# Patient Record
Sex: FEMALE | Race: OTHER | Employment: UNEMPLOYED | URBAN - METROPOLITAN AREA
[De-identification: names, ages, dates, MRNs, and addresses within clinical notes are randomized per-mention and may not be internally consistent; named-entity substitution may affect disease eponyms.]

---

## 2017-01-24 ENCOUNTER — TELEPHONE (OUTPATIENT)
Dept: FAMILY MEDICINE CLINIC | Age: 52
End: 2017-01-24

## 2017-02-14 ENCOUNTER — OFFICE VISIT (OUTPATIENT)
Dept: INTERNAL MEDICINE CLINIC | Age: 52
End: 2017-02-14

## 2017-02-14 VITALS
OXYGEN SATURATION: 97 % | HEART RATE: 89 BPM | SYSTOLIC BLOOD PRESSURE: 138 MMHG | RESPIRATION RATE: 20 BRPM | BODY MASS INDEX: 25.76 KG/M2 | TEMPERATURE: 98.1 F | DIASTOLIC BLOOD PRESSURE: 85 MMHG | WEIGHT: 170 LBS | HEIGHT: 68 IN

## 2017-02-14 DIAGNOSIS — Z13.220 SCREENING FOR HYPERLIPIDEMIA: ICD-10-CM

## 2017-02-14 DIAGNOSIS — J42 CHRONIC BRONCHITIS, UNSPECIFIED CHRONIC BRONCHITIS TYPE (HCC): ICD-10-CM

## 2017-02-14 DIAGNOSIS — R53.83 FATIGUE, UNSPECIFIED TYPE: ICD-10-CM

## 2017-02-14 DIAGNOSIS — Z12.39 BREAST CANCER SCREENING: ICD-10-CM

## 2017-02-14 DIAGNOSIS — F41.9 ANXIETY: ICD-10-CM

## 2017-02-14 DIAGNOSIS — Z12.11 COLON CANCER SCREENING: ICD-10-CM

## 2017-02-14 DIAGNOSIS — Z12.31 ENCOUNTER FOR SCREENING MAMMOGRAM FOR BREAST CANCER: ICD-10-CM

## 2017-02-14 DIAGNOSIS — Z12.39 ENCOUNTER FOR BREAST CANCER SCREENING OTHER THAN MAMMOGRAM: ICD-10-CM

## 2017-02-14 DIAGNOSIS — M54.12 CERVICAL RADICULOPATHY: Primary | ICD-10-CM

## 2017-02-14 DIAGNOSIS — Z11.59 NEED FOR HEPATITIS C SCREENING TEST: ICD-10-CM

## 2017-02-14 DIAGNOSIS — Z13.6 SCREENING FOR CARDIOVASCULAR CONDITION: ICD-10-CM

## 2017-02-14 DIAGNOSIS — J30.9 ALLERGIC RHINITIS, UNSPECIFIED ALLERGIC RHINITIS TRIGGER, UNSPECIFIED RHINITIS SEASONALITY: ICD-10-CM

## 2017-02-14 RX ORDER — FLUTICASONE PROPIONATE 50 MCG
2 SPRAY, SUSPENSION (ML) NASAL DAILY
Qty: 1 BOTTLE | Refills: 11 | Status: SHIPPED | OUTPATIENT
Start: 2017-02-14 | End: 2018-11-06 | Stop reason: SDUPTHER

## 2017-02-14 RX ORDER — AZITHROMYCIN 250 MG/1
TABLET, FILM COATED ORAL
COMMUNITY
Start: 2017-01-23 | End: 2017-02-14 | Stop reason: ALTCHOICE

## 2017-02-14 RX ORDER — FLUTICASONE PROPIONATE 50 MCG
2 SPRAY, SUSPENSION (ML) NASAL DAILY
COMMUNITY
Start: 2017-01-23 | End: 2017-02-14 | Stop reason: SDUPTHER

## 2017-02-14 RX ORDER — CYCLOBENZAPRINE HCL 5 MG
5 TABLET ORAL
Qty: 30 TAB | Refills: 3 | Status: SHIPPED | OUTPATIENT
Start: 2017-02-14 | End: 2018-05-31

## 2017-02-14 RX ORDER — EPINEPHRINE 0.3 MG/.3ML
0.3 INJECTION SUBCUTANEOUS
Qty: 1 SYRINGE | Refills: 11 | Status: SHIPPED | OUTPATIENT
Start: 2017-02-14 | End: 2019-01-07 | Stop reason: SDUPTHER

## 2017-02-14 RX ORDER — ALBUTEROL SULFATE 90 UG/1
1 AEROSOL, METERED RESPIRATORY (INHALATION)
Qty: 1 INHALER | Refills: 11 | Status: SHIPPED | OUTPATIENT
Start: 2017-02-14 | End: 2018-11-06 | Stop reason: SDUPTHER

## 2017-02-14 RX ORDER — LORAZEPAM 0.5 MG/1
0.5 TABLET ORAL
Qty: 3 TAB | Refills: 0 | Status: SHIPPED | OUTPATIENT
Start: 2017-02-14 | End: 2017-03-26 | Stop reason: ALTCHOICE

## 2017-02-14 RX ORDER — FLUTICASONE PROPIONATE 110 UG/1
1 AEROSOL, METERED RESPIRATORY (INHALATION) EVERY 12 HOURS
Qty: 1 INHALER | Refills: 11 | Status: SHIPPED | OUTPATIENT
Start: 2017-02-14 | End: 2018-11-06 | Stop reason: SDUPTHER

## 2017-02-14 RX ORDER — ALBUTEROL SULFATE 90 UG/1
2 AEROSOL, METERED RESPIRATORY (INHALATION)
COMMUNITY
Start: 2017-01-23 | End: 2017-02-14 | Stop reason: ALTCHOICE

## 2017-02-14 NOTE — MR AVS SNAPSHOT
Visit Information Date & Time Provider Department Dept. Phone Encounter #  
 2/14/2017  1:30 PM Michael Leslie MD Internist of [address] [id number] Follow-up Instructions Return in about 6 weeks (around 3/28/2017) for COPD. Your Appointments 3/28/2017  2:00 PM  
Office Visit with Michael Leslie MD  
Internist of [address] [place] [address]-[facility]) Appt Note: ov 6wks jacques [address], [address] [address] [address] [place]  
  
   
 [address], [address] Upcoming Health Maintenance Date Due Hepatitis C Screening 1965 DTaP/Tdap/Td series (1 - Tdap) 8/11/1984 BREAST CANCER SCRN MAMMOGRAM 8/11/2013 FOBT Q 1 YEAR AGE 50-75 8/11/2013 INFLUENZA AGE 9 TO ADULT 8/1/2016 PAP AKA CERVICAL CYTOLOGY 2/14/2020 Allergies as of 2/14/2017  Review Complete On: 2/14/2017 By: James Tillman Severity Noted Reaction Type Reactions Latex, Natural Rubber Medium 02/14/2017    Rash Amoxicillin High 02/14/2017    Shortness of Breath, Swelling Aspirin High 02/14/2017    Itching Bactrim Ds [Sulfamethoxazole-trimethoprim] High 02/14/2017    Diarrhea, Nausea and Vomiting Benadryl [Diphenhydramine Hcl] High 02/14/2017    Shortness of Breath, Swelling Codeine High 02/14/2017    Diarrhea, Itching, Nausea and Vomiting Levaquin [Levofloxacin] High 02/14/2017    Rash Kathryn Bent Diphenhydramine Medium 02/14/2017    Itching Tylenol [Acetaminophen] Medium 02/14/2017    Itching Current Immunizations  Never Reviewed No immunizations on file. Not reviewed this visit You Were Diagnosed With   
  
 Codes Comments Cervical radiculopathy    -  Primary ICD-10-CM: M54.12 
ICD-9-CM: 723.4 Anxiety     ICD-10-CM: F41.9 ICD-9-CM: 300.00 Colon cancer screening     ICD-10-CM: Z12.11 ICD-9-CM: V76.51  Breast cancer screening     ICD-10-CM: Z12.39 
 ICD-9-CM: V76.10 Need for hepatitis C screening test     ICD-10-CM: Z11.59 
ICD-9-CM: V73.89 Fatigue, unspecified type     ICD-10-CM: R53.83 ICD-9-CM: 780.79 Screening for hyperlipidemia     ICD-10-CM: Z13.220 ICD-9-CM: V77.91 Screening for cardiovascular condition     ICD-10-CM: Z13.6 ICD-9-CM: V81.2 Allergic rhinitis, unspecified allergic rhinitis trigger, unspecified rhinitis seasonality     ICD-10-CM: J30.9 ICD-9-CM: 477.9 Chronic bronchitis, unspecified chronic bronchitis type ([facility]     ICD-10-CM: H94 ICD-9-CM: 491.9 Vitals BP Pulse Temp Resp Height(growth percentile) Weight(growth percentile) 138/85 (BP 1 Location: Left arm, BP Patient Position: Sitting) 89 98.1 °F (36.7 °C) (Oral) 20 5' 7.72\" (1.72 m) 170 lb (77.1 kg) SpO2 BMI OB Status Smoking Status 97% 26.06 kg/m2 Hysterectomy Current Every Day Smoker BMI and BSA Data Body Mass Index Body Surface Area 26.06 kg/m 2 1.92 m 2 Preferred Pharmacy Pharmacy Name Phone [facility] [address] [place] [address] [phone] Your Updated Medication List  
  
   
This list is accurate as of: 2/14/17  2:40 PM.  Always use your most recent med list.  
  
  
  
  
 albuterol 90 mcg/actuation inhaler Commonly known as:  PROVENTIL HFA, VENTOLIN HFA, PROAIR HFA Take 1 Puff by inhalation every four (4) hours as needed for Wheezing. cyclobenzaprine 5 mg tablet Commonly known as:  FLEXERIL Take 1 Tab by mouth three (3) times daily as needed for Muscle Spasm(s). EPINEPHrine 0.3 mg/0.3 mL injection Commonly known as:  EPIPEN  
0.3 mL by IntraMUSCular route once as needed for up to 1 dose. * fluticasone 110 mcg/actuation inhaler Commonly known as:  FLOVENT HFA Take 1 Puff by inhalation every twelve (12) hours. * fluticasone 50 mcg/actuation nasal spray Commonly known as:  Cathlyn Padilla 2 Sprays by Both Nostrils route daily. Indications: ALLERGIC RHINITIS LORazepam 0.5 mg tablet Commonly known as:  ATIVAN Take 1 Tab by mouth every one (1) hour as needed for Anxiety. Max Daily Amount: 12 mg. QVAR 80 mcg/actuation inhaler Generic drug:  beclomethasone Take 2 Puffs by inhalation two (2) times a day. Indications: SEVERE CHRONIC OBSTRUCTIVE PULMONARY DISEASE * Notice: This list has 2 medication(s) that are the same as other medications prescribed for you. Read the directions carefully, and ask your doctor or other care provider to review them with you. Prescriptions Printed Refills LORazepam (ATIVAN) 0.5 mg tablet 0 Sig: Take 1 Tab by mouth every one (1) hour as needed for Anxiety. Max Daily Amount: 12 mg. Class: Print Route: Oral  
  
Prescriptions Sent to Pharmacy Refills  
 albuterol (PROVENTIL HFA, VENTOLIN HFA, PROAIR HFA) 90 mcg/actuation inhaler 11 Sig: Take 1 Puff by inhalation every four (4) hours as needed for Wheezing. Class: Normal  
 Pharmacy: [facility] [address] Ph #: [phone] Route: Inhalation  
 fluticasone (FLOVENT HFA) 110 mcg/actuation inhaler 11 Sig: Take 1 Puff by inhalation every twelve (12) hours. Class: Normal  
 Pharmacy: [facility] [address] Ph #: [phone] Route: Inhalation  
 fluticasone (FLONASE) 50 mcg/actuation nasal spray 11 Si Sprays by Both Nostrils route daily. Indications: ALLERGIC RHINITIS Class: Normal  
 Pharmacy: [facility] [address] Ph #: [phone] Route: Both Nostrils EPINEPHrine (EPIPEN) 0.3 mg/0.3 mL injection 11 Si.3 mL by IntraMUSCular route once as needed for up to 1 dose. Class: Normal  
 Pharmacy: [facility] [address] Ph #: [phone] Route: IntraMUSCular cyclobenzaprine (FLEXERIL) 5 mg tablet 3 Sig: Take 1 Tab by mouth three (3) times daily as needed for Muscle Spasm(s). Class: Normal  
 Pharmacy: [address], [address] [place] [address] Ph #: [phone] Route: Oral  
  
We Performed the Following REFERRAL FOR COLONOSCOPY [[id number] Custom] REFERRAL TO ORTHOPEDICS [WET508 Custom] Comments:  
 Please schedule pt to see  (Edda Burkitt) if covered by her insurance. REFERRAL TO PSYCHIATRY [REF91 Custom] Follow-up Instructions Return in about 6 weeks (around 3/28/2017) for COPD. To-Do List   
 02/14/2017 Lab:  CBC WITH AUTOMATED DIFF   
  
 02/14/2017 Lab:  HEPATITIS C AB   
  
 02/14/2017 Lab:  LIPID PANEL   
  
 02/14/2017 Imaging:  TITO MAMMO BI DX INCL CAD Around 02/14/2017 Lab:  METABOLIC PANEL, BASIC   
  
 02/14/2017 Imaging:  MRI CERV SPINE W WO CONT Around 02/14/2017 Lab:  TSH AND FREE T4 Referral Information Referral ID Referred By Referred To  
  
 [id number] Suzi Shock Not Available Visits Status Start Date End Date 1 New Request 2/14/17 2/14/18 If your referral has a status of pending review or denied, additional information will be sent to support the outcome of this decision. Referral ID Referred By Referred To  
 [id number] Suzi Shock Not Available Visits Status Start Date End Date 1 New Request 2/14/17 2/14/18 If your referral has a status of pending review or denied, additional information will be sent to support the outcome of this decision. Referral ID Referred By Referred To  
 [id number] Suzi Shock Not Available Visits Status Start Date End Date 1 New Request 2/14/17 2/14/18 If your referral has a status of pending review or denied, additional information will be sent to support the outcome of this decision. Patient Instructions Health Maintenance Due Topic Date Due  
  Hepatitis C Screening  1965  DTaP/Tdap/Td series (1 - Tdap) 08/11/1984  PAP AKA CERVICAL CYTOLOGY  08/11/1984  BREAST CANCER SCRN MAMMOGRAM  08/11/2013  FOBT Q 1 YEAR AGE 50-75  08/11/2013  INFLUENZA AGE 9 TO ADULT  08/01/2016 Patient was given a copy of the Advanced Directive form and understands to bring it in once completed. PLAN: 
Marin points we discussed today: 1. Call our office if symptoms worsen or if you have any questions 2. Mammogram ordered 3. Referral placed for colon cancer screening 4. Referral placed to see [facility]. Make sure to schedule your apt. 
 
5. Lab work ordered to screen for kidney disease, high cholesterol, hepatitis C, and thyroid disease. 6. Cervical spine MRI ordered. Referral placed to see  (Orthopedic Surgery). 7. Epinephrine pen ordered. Dr. Radha Tenorio [facility] of [address] [place] [address] [address] Phone: [phone] Fax: [phone] Thank you for choosing [facility] for all of your health care needs. Chronic Obstructive Pulmonary Disease (COPD): Care Instructions Your Care Instructions Chronic obstructive pulmonary disease (COPD) is a general term for a group of lung diseases, including emphysema and chronic bronchitis. People with COPD have decreased airflow in and out of the lungs, which makes it hard to breathe. The airways also can get clogged with thick mucus. Cigarette smoking is a major cause of COPD. Although there is no cure for COPD, you can slow its progress. Following your treatment plan and taking care of yourself can help you feel better and live longer. Follow-up care is a key part of your treatment and safety. Be sure to make and go to all appointments, and call your doctor if you are having problems. It's also a good idea to know your test results and keep a list of the medicines you take. How can you care for yourself at home? Staying healthy · Do not smoke. This is the most important step you can take to prevent more damage to your lungs. If you need help quitting, talk to your doctor about stop-smoking programs and medicines. These can increase your chances of quitting for good. · Avoid colds and flu. Get a pneumococcal vaccine shot. If you have had one before, ask your doctor whether you need a second dose. Get the flu vaccine every fall. If you must be around people with colds or the flu, wash your hands often. · Avoid secondhand smoke, air pollution, and high altitudes. Also avoid cold, dry air and hot, humid air. Stay at home with your windows closed when air pollution is bad. Medicines and oxygen therapy · Take your medicines exactly as prescribed. Call your doctor if you think you are having a problem with your medicine. · You may be taking medicines such as: ¨ Bronchodilators. These help open your airways and make breathing easier. Bronchodilators are either short-acting (work for 6 to 9 hours) or long-acting (work for 24 hours). You inhale most bronchodilators, so they start to act quickly. Always carry your quick-relief inhaler with you in case you need it while you are away from home. ¨ Corticosteroids (prednisone, budesonide). These reduce airway inflammation. They come in pill or inhaled form. You must take these medicines every day for them to work well. · A spacer may help you get more inhaled medicine to your lungs. Ask your doctor or pharmacist if a spacer is right for you. If it is, ask how to use it properly. · Do not take any vitamins, over-the-counter medicine, or herbal products without talking to your doctor first. 
· If your doctor prescribed antibiotics, take them as directed. Do not stop taking them just because you feel better. You need to take the full course of antibiotics.  
· Oxygen therapy boosts the amount of oxygen in your blood and helps you breathe easier. Use the flow rate your doctor has recommended, and do not change it without talking to your doctor first. 
Activity · Get regular exercise. Walking is an easy way to get exercise. Start out slowly, and walk a little more each day. · Pay attention to your breathing. You are exercising too hard if you cannot talk while you are exercising. · Take short rest breaks when doing household chores and other activities. · Learn breathing methodssuch as breathing through pursed lipsto help you become less short of breath. · If your doctor has not set you up with a pulmonary rehabilitation program, talk to him or her about whether rehab is right for you. Rehab includes exercise programs, education about your disease and how to manage it, help with diet and other changes, and emotional support. Diet · Eat regular, healthy meals. Use bronchodilators about 1 hour before you eat to make it easier to eat. Eat several small meals instead of three large ones. Drink beverages at the end of the meal. Avoid foods that are hard to chew. · Eat foods that contain protein so that you do not lose muscle mass. Mental health · Talk to your family, friends, or a therapist about your feelings. It is normal to feel frightened, angry, hopeless, helpless, and even guilty. Talking openly about bad feelings can help you cope. If these feelings last, talk to your doctor. When should you call for help? Call 911 anytime you think you may need emergency care. For example, call if: 
· You have severe trouble breathing. Call your doctor now or seek immediate medical care if: 
· You have new or worse trouble breathing. · You cough up blood. · You have a fever. Watch closely for changes in your health, and be sure to contact your doctor if: 
· You cough more deeply or more often, especially if you notice more mucus or a change in the color of your mucus. · You have new or worse swelling in your legs or belly. · You are not getting better as expected. Where can you learn more? Go to http://star-sofía.info/. Jim Scott in the search box to learn more about \"Chronic Obstructive Pulmonary Disease (COPD): Care Instructions. \" Current as of: May 23, 2016 Content Version: 11.1 © 5328-8351 [employer]. Care instructions adapted under license by [employer] (which disclaims liability or warranty for this information). If you have questions about a medical condition or this instruction, always ask your healthcare professional. [address] any warranty or liability for your use of this information. Introducing \[facility]\"" & HEALTH SERVICES! [employer] introduces [employer] patient portal. Now you can access parts of your medical record, email your doctor's office, and request medication refills online. 1. In your internet browser, go to [email or web address] 2. Click on the First Time User? Click Here link in the Sign In box. You will see the New Member Sign Up page. 3. Enter your [employer] Access Code exactly as it appears below. You will not need to use this code after youve completed the sign-up process. If you do not sign up before the expiration date, you must request a new code. · [employer] Access Code: [email or web address] Expires: 5/15/2017  2:40 PM 
 
4. Enter the last four digits of your Social Security Number (xxxx) and Date of Birth (mm/dd/yyyy) as indicated and click Submit. You will be taken to the next sign-up page. 5. Create a [employer] ID. This will be your [employer] login ID and cannot be changed, so think of one that is secure and easy to remember. 6. Create a [employer] password. You can change your password at any time. 7. Enter your Password Reset Question and Answer. This can be used at a later time if you forget your password. 8. Enter your e-mail address.  You will receive e-mail notification when new information is available in [employer]. 9. Click Sign Up. You can now view and download portions of your medical record. 10. Click the Download Summary menu link to download a portable copy of your medical information. If you have questions, please visit the Frequently Asked Questions section of the [employer] website. Remember, [employer] is NOT to be used for urgent needs. For medical emergencies, dial 911. Now available from your iPhone and Android! Please provide this summary of care documentation to your next provider. If you have any questions after today's visit, please call [phone].

## 2017-02-14 NOTE — PROGRESS NOTES
Chief Complaint   Patient presents with    Establish Care    Depression     Active Diagnosed and will need a new Psychiatrist     Referral Request     Pulmonary Asthma and Severe COPD      Patient was given a copy of the Advanced Directive form and understands to bring it in once completed. 1. Have you been to the ER, urgent care clinic since your last visit? Hospitalized since your last visit?not applicable    2. Have you seen or consulted any other health care providers outside of the [address] [place] [address] since your last visit? Include any pap smears or colon screening. not applicable.

## 2017-02-14 NOTE — PROGRESS NOTES
INTERNISTS [facility]:  2/15/2017, MRN: [id number]      Ford Robin is a 46 y.o. female and presents to clinic to [facility]; Depression (Active Diagnosed and will need a new Psychiatrist ); and Referral Request (Pulmonary Asthma and Severe COPD)      Subjective:   Pt is a 48yo female with h/o anxiety and depression, COPD, nicotine dependence, neck pain, and asthma. 1. Neck Pain:   - Had neck surgery 4 years ago   - Stressors: financial; a family member is out of work   - Present x 2 wks   - Pain is waking her up from sleep   - Pain is similar to when she had neck pain prior to needing neck surgery 4 years ago   - No h/o trauma over the past month   - Pain is localized to her left neck and does not radiate   - No relief with NSAIDs with no relief. - Tylenol causes pruritus   - Biofreeze helps to relieve sx   - Previous physician: .    - Now having left arm paresthesias similar to what she had 4 years ago    2. Anxiety/PTSD:    - Can't go behind the wheel to drive due to anxiety   - S/p a dog bite a year ago on her leg and has worsening anxiety since then   - Given a prescription for clonazepam by her previous provider; per , her last prescription was filled >6 months ago   - Asking for a clonazepam refill today   - Did not respond to xanax in the past  - Tried venlafaxine and lexapro without good relief of her sx    - Participated in \"talk therapy\"   - Was followed by [address] [place] [address] Psychiatry but pt was discharged after \"no showing\" several times. Pt's  is out of work and pt was unable to pay her copays. - She has h/o severe anxiety and depression, requiring hospitalization when she was in her 25s. Pt at that time, had suicidal ideation. She reports none since then. Pt is  to another man and this marriage is going well for her. - Anxiety is associated with fatigue. No melena. No vaginal bleeding. No hematochezia.      3. COPD:   - Smoking >20 years   - Smoking 1/2-1 pack per day   - Wants to stop smoking   - Scared to try Chantix given the potential side effects affecting one's mood. - On Qvar but not taking it regularly   - On albuterol which pt uses bid. 4. Allergic Rhinitis:   - Had SOB/difficulty breathing when exposed to penicillin   - Has multiple environmental allergies. - Using a nasal steroid for symptomatic relief   - Needs a refill on her epi-pen. 5. Health Maintenance:   - Overdue for colonoscopy and mammogram      Patient Active Problem List    Diagnosis Date Noted    Anxiety and depression 02/15/2017    Chronic bronchitis 02/14/2017    Allergic rhinitis 02/14/2017       Current Outpatient Prescriptions   Medication Sig Dispense Refill    QVAR 80 mcg/actuation inhaler Take 2 Puffs by inhalation two (2) times a day. Indications: SEVERE CHRONIC OBSTRUCTIVE PULMONARY DISEASE      LORazepam (ATIVAN) 0.5 mg tablet Take 1 Tab by mouth every one (1) hour as needed for Anxiety. Max Daily Amount: 12 mg. 3 Tab 0    albuterol (PROVENTIL HFA, VENTOLIN HFA, PROAIR HFA) 90 mcg/actuation inhaler Take 1 Puff by inhalation every four (4) hours as needed for Wheezing. 1 Inhaler 11    fluticasone (FLOVENT HFA) 110 mcg/actuation inhaler Take 1 Puff by inhalation every twelve (12) hours. 1 Inhaler 11    fluticasone (FLONASE) 50 mcg/actuation nasal spray 2 Sprays by Both Nostrils route daily. Indications: ALLERGIC RHINITIS 1 Bottle 11    EPINEPHrine (EPIPEN) 0.3 mg/0.3 mL injection 0.3 mL by IntraMUSCular route once as needed for up to 1 dose. 1 Syringe 11    cyclobenzaprine (FLEXERIL) 5 mg tablet Take 1 Tab by mouth three (3) times daily as needed for Muscle Spasm(s).  30 Tab 3       Allergies   Allergen Reactions    Latex, Natural Rubber Rash    Amoxicillin Shortness of Breath and Swelling    Aspirin Itching    Bactrim Ds [Sulfamethoxazole-Trimethoprim] Diarrhea and Nausea and Vomiting    Benadryl [Diphenhydramine Hcl] Shortness of Breath and Swelling    Codeine Diarrhea, Itching and Nausea and Vomiting    Levaquin [Levofloxacin] Rash     Thrush    Diphenhydramine Itching    Tylenol [Acetaminophen] Itching       Past Medical History   Diagnosis Date    Arthritis     Asthma     Cancer ([facility] [address] 2006     cervical    Cervical cancer ([facility] 2006    Chronic obstructive pulmonary disease (HCC)     Chronic pain     Depression     H/O cervical spine surgery 2013     C3-7    Headache     Psychotic disorder      Anxiety and Panic Disorders       Past Surgical History   Procedure Laterality Date    Hx hysterectomy      Hx tonsillectomy      Hx wisdom teeth extraction       x4       Family History   Problem Relation Age of Onset    Cancer Mother      colon    Elevated Lipids Mother     Thyroid Disease Mother     Allergic Rhinitis Mother     Heart Disease Father     Diabetes Father     Hypertension Father     Kidney Disease Father     No Known Problems Sister     Heart Disease Maternal Grandmother     Heart Disease Maternal Grandfather     Heart Disease Paternal Grandmother     No Known Problems Paternal Grandfather     Cancer Sister     Depression Son        Social History   Substance Use Topics    Smoking status: Current Every Day Smoker     Packs/day: 1.00     Years: 31.00    Smokeless tobacco: Never Used    Alcohol use Yes      Comment: social       ROS   Review of Systems   Constitutional: Negative for chills and fever. HENT: Negative for ear pain and sore throat. Eyes: Negative for blurred vision and pain. Respiratory: Positive for shortness of breath (chronic). Negative for cough. Cardiovascular: Negative for chest pain. Gastrointestinal: Negative for abdominal pain. Genitourinary: Negative for dysuria. Musculoskeletal: Positive for myalgias and neck pain. Negative for joint pain. Skin: Negative for rash. Neurological: Positive for tingling (LUE - see HPI). Negative for focal weakness and headaches.    Endo/Heme/Allergies: Positive for environmental allergies. Does not bruise/bleed easily. Psychiatric/Behavioral: Negative for substance abuse. Objective     Vitals:    02/14/17 1328   BP: 138/85   Pulse: 89   Resp: 20   Temp: 98.1 °F (36.7 °C)   TempSrc: Oral   SpO2: 97%   Weight: 170 lb (77.1 kg)   Height: 5' 7.72\" (1.72 m)   PainSc:   7   PainLoc: Neck       Physical Exam   Constitutional: She is oriented to person, place, and time and well-developed, well-nourished, and in no distress. HENT:   Head: Normocephalic and atraumatic. Right Ear: External ear normal.   Left Ear: External ear normal.   Nose: Nose normal.   Mouth/Throat: Oropharynx is clear and moist. No oropharyngeal exudate. Clear TMs   Eyes: Conjunctivae and EOM are normal. Pupils are equal, round, and reactive to light. Right eye exhibits no discharge. Left eye exhibits no discharge. No scleral icterus. Neck: Neck supple. Cardiovascular: Normal rate, regular rhythm, normal heart sounds and intact distal pulses. Exam reveals no gallop and no friction rub. No murmur heard. Pulmonary/Chest: Effort normal and breath sounds normal. No respiratory distress. She has no wheezes. She has no rales. Abdominal: Soft. Bowel sounds are normal. She exhibits no distension. There is no tenderness. There is no rebound and no guarding. No hepatomegaly   Musculoskeletal: She exhibits no edema or tenderness (BUE are NTTP). +Has left trapezius superior border and left cervical paraspinal muscles that are TTP. Cervical spinous processes are TTP. Lymphadenopathy:     She has no cervical adenopathy. Neurological: She is alert and oriented to person, place, and time. She exhibits normal muscle tone. Gait normal.   Symmetric strength in BUE   Skin: Skin is warm and dry. No erythema. Psychiatric: Affect normal.   Nursing note and vitals reviewed. Assessment/Plan:   1. Cervical radiculopathy and Muscle Spasm: Has h/o cervical spine surgery.   - Referral to Orthopedics -  is preferred per pt request - as he did her previous cervical spine surgery. - Cervical spine MRI ordered. Ativan ordered prn anxiety with getting this study done. - Cyclobenzaprine ordered as needed for presumed muscle spasm. ORDERS:  - REFERRAL TO ORTHOPEDICS  - MRI CERV SPINE W WO CONT; Future  - LORazepam (ATIVAN) 0.5 mg tablet; Take 1 Tab by mouth every one (1) hour as needed for Anxiety. Max Daily Amount: 12 mg. Dispense: 3 Tab; Refill: 0  - cyclobenzaprine (FLEXERIL) 5 mg tablet; Take 1 Tab by mouth three (3) times daily as needed for Muscle Spasm(s). Dispense: 30 Tab; Refill: 3    2. Anxiety: Has h/o depression.   - Referral to Psychiatry - pt given the information for [facility] and will call to schedule her apt with them    ORDERS:  - REFERRAL TO PSYCHIATRY  - TSH AND FREE T4; Future    3. Cancer screenings:  - Referral placed for colonoscopy to screen or colon cancer  - Mammogram ordered to screen for breast cancer    ORDERS:  - REFERRAL FOR COLONOSCOPY  - TITO MAMMO BI DX INCL CAD; Future    4. Need for hepatitis C screening test  - Hepatitis C screening ordered per our discussion about CDC recommendations    ORDERS:  - HEPATITIS C AB; Future    5. Fatigue, unspecified type, associated with #2.  - Checking a BMP, TFTs, and a CBC    ORDERS:  - METABOLIC PANEL, BASIC; Future  - TSH AND FREE T4; Future  - CBC WITH AUTOMATED DIFF; Future    6. Screening for hyperlipidemia  - Lipid panel ordered    ORDERS:  - LIPID PANEL; Future    7. Allergic rhinitis, unspecified allergic rhinitis trigger, unspecified rhinitis seasonality  - Flonase refilled. Epi-pen refilled. ORDERS:  - fluticasone (FLONASE) 50 mcg/actuation nasal spray; 2 Sprays by Both Nostrils route daily. Indications: ALLERGIC RHINITIS  Dispense: 1 Bottle; Refill: 11  - EPINEPHrine (EPIPEN) 0.3 mg/0.3 mL injection; 0.3 mL by IntraMUSCular route once as needed for up to 1 dose. Dispense: 1 Syringe; Refill: 11    8.  Chronic bronchitis, unspecified chronic bronchitis type:  - Albuterol refilled. Qvar refilled. Pt instructed on the proper technique when using an inhaler. She was also asked to take the Qvar as prescribed vs prn. ORDERS:  - albuterol (PROVENTIL HFA, VENTOLIN HFA, PROAIR HFA) 90 mcg/actuation inhaler; Take 1 Puff by inhalation every four (4) hours as needed for Wheezing. Dispense: 1 Inhaler; Refill: 11  - fluticasone (FLOVENT HFA) 110 mcg/actuation inhaler; Take 1 Puff by inhalation every twelve (12) hours. Dispense: 1 Inhaler; Refill: 11      Health Maintenance Due   Topic Date Due    Hepatitis C Screening  1965    Pneumococcal 19-64 Medium Risk (1 of 1 - PPSV23) 08/11/1982    DTaP/Tdap/Td series (1 - Tdap) 08/11/1984    BREAST CANCER SCRN MAMMOGRAM  08/11/2013    FOBT Q 1 YEAR AGE 50-75  08/11/2013    INFLUENZA AGE 9 TO ADULT  08/01/2016       Lab review: orders written for new lab studies as appropriate; see orders    I have discussed the diagnosis with the patient and the intended plan as seen in the above orders. The patient has received an after-visit summary and questions were answered concerning future plans. I have discussed medication side effects and warnings with the patient as well. I have reviewed the plan of care with the patient, accepted their input and they are in agreement with the treatment goals. All questions were answered. The patient understands the plan of care. Handouts provided today with above information. Pt instructed if symptoms worsen to call the office or report to the ED for continued care. Greater than 50% of the visit time was spent in counseling and/or coordination of care. The patient was counseled on the dangers of tobacco use, and was advised to quit. Reviewed strategies to maximize success, including removing cigarettes and smoking materials from environment, stress management and substitution of other forms of reinforcement.  2 minutes were spent on smoking/tobacco cessation      Follow-up Disposition:  Return in about 6 weeks (around 3/28/2017) for COPD.     Michael Leslie MD

## 2017-02-14 NOTE — PATIENT INSTRUCTIONS
Health Maintenance Due   Topic Date Due    Hepatitis C Screening  1965    DTaP/Tdap/Td series (1 - Tdap) 08/11/1984    PAP AKA CERVICAL CYTOLOGY  08/11/1984    BREAST CANCER SCRN MAMMOGRAM  08/11/2013    FOBT Q 1 YEAR AGE 50-75  08/11/2013    INFLUENZA AGE 9 TO ADULT  08/01/2016     Patient was given a copy of the Advanced Directive form and understands to bring it in once completed. PLAN:  Marin points we discussed today:  1. Call our office if symptoms worsen or if you have any questions    2. Mammogram ordered    3. Referral placed for colon cancer screening    4. Referral placed to see [facility]. Make sure to schedule your apt.    5. Lab work ordered to screen for kidney disease, high cholesterol, hepatitis C, and thyroid disease. 6. Cervical spine MRI ordered. Referral placed to see  (Orthopedic Surgery). 7. Epinephrine pen ordered. Dr. Debby Younger  [facility] [address].  Phone: [phone]  Fax: [phone]    Thank you for choosing [employer] for all of your health care needs. Chronic Obstructive Pulmonary Disease (COPD): Care Instructions  Your Care Instructions    Chronic obstructive pulmonary disease (COPD) is a general term for a group of lung diseases, including emphysema and chronic bronchitis. People with COPD have decreased airflow in and out of the lungs, which makes it hard to breathe. The airways also can get clogged with thick mucus. Cigarette smoking is a major cause of COPD. Although there is no cure for COPD, you can slow its progress. Following your treatment plan and taking care of yourself can help you feel better and live longer. Follow-up care is a key part of your treatment and safety. Be sure to make and go to all appointments, and call your doctor if you are having problems.  It's also a good idea to know your test results and keep a list of the medicines you take. How can you care for yourself at home? Staying healthy  · Do not smoke. This is the most important step you can take to prevent more damage to your lungs. If you need help quitting, talk to your doctor about stop-smoking programs and medicines. These can increase your chances of quitting for good. · Avoid colds and flu. Get a pneumococcal vaccine shot. If you have had one before, ask your doctor whether you need a second dose. Get the flu vaccine every fall. If you must be around people with colds or the flu, wash your hands often. · Avoid secondhand smoke, air pollution, and high altitudes. Also avoid cold, dry air and hot, humid air. Stay at home with your windows closed when air pollution is bad. Medicines and oxygen therapy  · Take your medicines exactly as prescribed. Call your doctor if you think you are having a problem with your medicine. · You may be taking medicines such as:  ¨ Bronchodilators. These help open your airways and make breathing easier. Bronchodilators are either short-acting (work for 6 to 9 hours) or long-acting (work for 24 hours). You inhale most bronchodilators, so they start to act quickly. Always carry your quick-relief inhaler with you in case you need it while you are away from home. ¨ Corticosteroids (prednisone, budesonide). These reduce airway inflammation. They come in pill or inhaled form. You must take these medicines every day for them to work well. · A spacer may help you get more inhaled medicine to your lungs. Ask your doctor or pharmacist if a spacer is right for you. If it is, ask how to use it properly. · Do not take any vitamins, over-the-counter medicine, or herbal products without talking to your doctor first.  · If your doctor prescribed antibiotics, take them as directed. Do not stop taking them just because you feel better. You need to take the full course of antibiotics.   · Oxygen therapy boosts the amount of oxygen in your blood and helps you breathe easier. Use the flow rate your doctor has recommended, and do not change it without talking to your doctor first.  Activity  · Get regular exercise. Walking is an easy way to get exercise. Start out slowly, and walk a little more each day. · Pay attention to your breathing. You are exercising too hard if you cannot talk while you are exercising. · Take short rest breaks when doing household chores and other activities. · Learn breathing methodssuch as breathing through pursed lipsto help you become less short of breath. · If your doctor has not set you up with a pulmonary rehabilitation program, talk to him or her about whether rehab is right for you. Rehab includes exercise programs, education about your disease and how to manage it, help with diet and other changes, and emotional support. Diet  · Eat regular, healthy meals. Use bronchodilators about 1 hour before you eat to make it easier to eat. Eat several small meals instead of three large ones. Drink beverages at the end of the meal. Avoid foods that are hard to chew. · Eat foods that contain protein so that you do not lose muscle mass. Mental health  · Talk to your family, friends, or a therapist about your feelings. It is normal to feel frightened, angry, hopeless, helpless, and even guilty. Talking openly about bad feelings can help you cope. If these feelings last, talk to your doctor. When should you call for help? Call 911 anytime you think you may need emergency care. For example, call if:  · You have severe trouble breathing. Call your doctor now or seek immediate medical care if:  · You have new or worse trouble breathing. · You cough up blood. · You have a fever. Watch closely for changes in your health, and be sure to contact your doctor if:  · You cough more deeply or more often, especially if you notice more mucus or a change in the color of your mucus.   · You have new or worse swelling in your legs or belly.  · You are not getting better as expected. Where can you learn more? Go to http://star-sofía.info/. Don Mcqueen in the search box to learn more about \"Chronic Obstructive Pulmonary Disease (COPD): Care Instructions. \"  Current as of: May 23, 2016  Content Version: 11.1  © 6049-4717 [employer]. Care instructions adapted under license by [employer] (which disclaims liability or warranty for this information). If you have questions about a medical condition or this instruction, always ask your healthcare professional. [address] any warranty or liability for your use of this information.

## 2017-02-15 PROBLEM — F32.A ANXIETY AND DEPRESSION: Status: ACTIVE | Noted: 2017-02-15

## 2017-02-15 PROBLEM — F41.9 ANXIETY AND DEPRESSION: Status: ACTIVE | Noted: 2017-02-15

## 2017-02-22 ENCOUNTER — TELEPHONE (OUTPATIENT)
Dept: INTERNAL MEDICINE CLINIC | Age: 52
End: 2017-02-22

## 2017-02-22 NOTE — TELEPHONE ENCOUNTER
Reviewed report generated by the [facility]. Does not demonstrate aberrancies or inconsistencies with regard to the prescribing of controlled medications to this patient by other providers.   Last filled on 2/18/17 #90 for a 14 day supply by a doctor at [facility]    However, pt is in pain management? shes never filled her morphine though according to
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
## 2017-02-27 ENCOUNTER — TELEPHONE (OUTPATIENT)
Dept: INTERNAL MEDICINE CLINIC | Age: 52
End: 2017-02-27
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
## 2017-02-28 ENCOUNTER — DOCUMENTATION ONLY (OUTPATIENT)
Dept: SURGERY | Age: 52
End: 2017-02-28

## 2017-02-28 DIAGNOSIS — G62.9 NEUROPATHY: Primary | ICD-10-CM
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
## 2017-03-07 NOTE — TELEPHONE ENCOUNTER
Referral completed for Dr. Rhoades [facility] [id number] 2/28/2017-2/28/2018 99 visits    Copy faxed to doctor and mailed to pt

## 2017-03-20 ENCOUNTER — TELEPHONE (OUTPATIENT)
Dept: INTERNAL MEDICINE CLINIC | Age: 52
End: 2017-03-20

## 2017-03-20 NOTE — TELEPHONE ENCOUNTER
Please have pt contact the physician who prescribed this controlled pain medication for her.     Respectfully,  Dr. Sam Harkins  [facility], [address] [place] [address].  Phone: [phone]  Fax: [phone]

## 2017-03-21 ENCOUNTER — TELEPHONE (OUTPATIENT)
Dept: INTERNAL MEDICINE CLINIC | Age: 52
End: 2017-03-21

## 2017-03-21 NOTE — TELEPHONE ENCOUNTER
Called patient to let her know that we would be unable to give her a prescription for oxycontin. Patient was given her appointment with Neurology with Dr Artemio Barahona that I made for her. Patient is scheduled for May 1, 2017 Patient was scheduled with Dr Iesha Desai on Monday but they did not have a referral and patient was suppose to see Dr Tiffanie Diaz whom has recently left the practice.   I did apologize to patient about the mix up with neurologist.  Patient was very upset about the pain medication and stated \"ok that is fine i am going to call the state and my insurance company to file a complaint I should not have to be in pain and my PCP should give me my pain medications\"  I apologized to patient again and stated that I would let Dr Sara Dominguez know and patient hung up

## 2017-03-21 NOTE — TELEPHONE ENCOUNTER
Pt stated tht the pain meds was given to her at the hospital, she said she already asked them for refill and she was told to contact her pcp, Osorio Chairez
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
## 2017-03-22 ENCOUNTER — OFFICE VISIT (OUTPATIENT)
Dept: INTERNAL MEDICINE CLINIC | Age: 52
End: 2017-03-22

## 2017-03-22 VITALS
WEIGHT: 170 LBS | TEMPERATURE: 96.8 F | OXYGEN SATURATION: 98 % | HEART RATE: 99 BPM | RESPIRATION RATE: 20 BRPM | HEIGHT: 67 IN | BODY MASS INDEX: 26.68 KG/M2 | SYSTOLIC BLOOD PRESSURE: 151 MMHG | DIASTOLIC BLOOD PRESSURE: 91 MMHG

## 2017-03-22 DIAGNOSIS — S32.009A CLOSED FRACTURE OF LUMBAR VERTEBRA, UNSPECIFIED FRACTURE MORPHOLOGY, UNSPECIFIED LUMBAR VERTEBRAL LEVEL, INITIAL ENCOUNTER (HCC): ICD-10-CM

## 2017-03-22 DIAGNOSIS — S12.9XXA CLOSED FRACTURE OF CERVICAL VERTEBRAL BODY (HCC): ICD-10-CM

## 2017-03-22 DIAGNOSIS — S22.20XD CLOSED FRACTURE OF STERNUM WITH ROUTINE HEALING, UNSPECIFIED PORTION OF STERNUM, SUBSEQUENT ENCOUNTER: Primary | ICD-10-CM

## 2017-03-22 DIAGNOSIS — Z79.899 CONTROLLED SUBSTANCE AGREEMENT SIGNED: ICD-10-CM

## 2017-03-22 RX ORDER — OXYCODONE HYDROCHLORIDE 5 MG/1
5 TABLET ORAL
Qty: 120 TAB | Refills: 0 | Status: SHIPPED | OUTPATIENT
Start: 2017-03-22 | End: 2017-04-19 | Stop reason: ALTCHOICE

## 2017-03-22 NOTE — PATIENT INSTRUCTIONS
Oxycodone, Rapid Release (By mouth)   Oxycodone Hydrochloride (pe-f-CIH-done brian-droe-KLOR-lynn)  Treats moderate to severe pain. This medicine is a narcotic pain reliever. Brand Name(s):ETH-Oxydose, Oxaydo, Oxy IR, Roxicodone   There may be other brand names for this medicine. When This Medicine Should Not Be Used: This medicine is not right for everyone. Do not use it if you had an allergic reaction to oxycodone, codeine, hydrocodone, dihydrocodeine, or morphine. How to Use This Medicine:   Capsule, Liquid, Tablet  · Take your medicine as directed. Your dose may need to be changed several times to find what works best for you. · Measure the oral liquid medicine with a marked measuring spoon, oral syringe, or medicine cup. · Swallow the Oxaydo tablet whole with enough water to swallow it completely. Do not break, crush, chew, or dissolve it. Do not wet the tablet before you put it in your mouth. · Missed dose: Take a dose as soon as you remember. If it is almost time for your next dose, wait until then and take a regular dose. Do not take extra medicine to make up for a missed dose. · Store the medicine in a closed container at room temperature, away from heat, moisture, and direct light. Store the medicine in a secure place to prevent others from getting it. Ask your pharmacist about the best way to dispose of medicine you do not use. Drugs and Foods to Avoid:   Ask your doctor or pharmacist before using any other medicine, including over-the-counter medicines, vitamins, and herbal products. · Some medicines can affect how oxycodone works.  Tell your doctor if you are using any of the following:  ¨ Amiodarone, quinidine  ¨ MAO inhibitor (MAOI)  ¨ Medicine to treat an infection (such as erythromycin, ketoconazole, rifampin)  ¨ Medicine to treat HIV/AIDS (such as ritonavir)  ¨ Medicine to treat depression or anxiety  ¨ Medicine to treat seizures (such as carbamazepine, phenytoin)  ¨ Phenothiazine medicine (such as chlorpromazine, perphenazine, prochlorperazine, promethazine, thioridazine)  · Tell your doctor if you use anything else that makes you sleepy. Some examples are allergy medicine, narcotic pain medicine, and alcohol. Also tell your doctor if you are using buprenorphine, butorphanol, nalbuphine, pentazocine, or a muscle relaxer. · Do not drink alcohol while you are using this medicine. Warnings While Using This Medicine:   · Tell your doctor if you are pregnant or breastfeeding, or if you have kidney disease, liver disease, heart disease, low blood pressure, lung disease or breathing problems (such as asthma, COPD), scoliosis, an enlarged prostate or trouble urinating, an underactive thyroid, [place] disease, gallbladder or pancreas problems, or stomach or bowel problems. Tell your doctor if you have a history of head injury, mental health problems, seizures, or alcohol or drug addiction. · This medicine may cause the following problems:  ¨ High risk of overdose, which can lead to death  ¨ Respiratory depression (serious breathing problem that can be life-threatening)  ¨ Low blood pressure  · This medicine may make you dizzy, drowsy, or faint. Do not drive or do anything else that could be dangerous until you know how this medicine affects you. Sit or lie down if you feel dizzy. Stand up carefully. · This medicine may cause constipation, especially with long-term use. Ask your doctor if you should use a laxative to prevent and treat constipation. Drink plenty of liquids to help avoid constipation. · This medicine can be habit-forming. Do not use more than your prescribed dose. Call your doctor if you think your medicine is not working. · Do not stop using this medicine suddenly. Your doctor will need to slowly decrease your dose before you stop it completely. · Keep all medicine out of the reach of children. Never share your medicine with anyone.   Possible Side Effects While Using This Medicine: Call your doctor right away if you notice any of these side effects:  · Allergic reaction: Itching or hives, swelling in your face or hands, swelling or tingling in your mouth or throat, chest tightness, trouble breathing  · Blue lips, fingernails, or skin, trouble breathing  · Extreme dizziness or weakness, shallow breathing, slow heartbeat, sweating, cold or clammy skin, seizures  · Lightheadedness, dizziness, fainting  · Severe constipation  If you notice these less serious side effects, talk with your doctor:   · Mild constipation, nausea, or vomiting  · Sleepiness, tiredness  If you notice other side effects that you think are caused by this medicine, tell your doctor. Call your doctor for medical advice about side effects. You may report side effects to FDA at 0-835-FDA-6193  © 2016 [address] is for End User's use only and may not be sold, redistributed or otherwise used for commercial purposes. The above information is an  only. It is not intended as medical advice for individual conditions or treatments. Talk to your doctor, nurse or pharmacist before following any medical regimen to see if it is safe and effective for you.

## 2017-03-22 NOTE — MR AVS SNAPSHOT
Visit Information Date & Time Provider Department Dept. Phone Encounter #  
 3/22/2017 12:30 PM Windle Babinski, MD Internist of [address] [id number] Follow-up Instructions Return in about 4 weeks (around 4/19/2017) for pain, fractures, BP check. Your Appointments 3/28/2017  2:00 PM  
Office Visit with Windle Babinski, MD  
Internist of [address] [place] [address]) Appt Note: ov 6wks jacques [address], [address] Sinda Rosario [address]  
  
   
 [address], [address] 4/19/2017  2:30 PM  
Office Visit with Windle Babinski, MD  
Internist of [address] [place] [address]) Appt Note: 4 wk f/u  
 [address] [facility], [address] [place] [address]  
[phone] Upcoming Health Maintenance Date Due Hepatitis C Screening 1965 COLONOSCOPY 8/11/1981 Pneumococcal 19-64 Medium Risk (1 of 1 - PPSV23) 8/11/1982 BREAST CANCER SCRN MAMMOGRAM 8/11/2013 INFLUENZA AGE 9 TO ADULT 8/1/2016 PAP AKA CERVICAL CYTOLOGY 2/14/2020 DTaP/Tdap/Td series (2 - Td) 8/5/2025 Allergies as of 3/22/2017  Review Complete On: 3/22/2017 By: Reyna Samano LPN Severity Noted Reaction Type Reactions Latex, Natural Rubber Medium 02/14/2017    Rash Latex, Natural Rubber  08/11/2015    Hives Amoxicillin High 08/05/2015    Anaphylaxis Amoxicillin High 02/14/2017    Shortness of Breath, Swelling Aspirin High 02/14/2017    Itching Bactrim Ds [Sulfamethoxazole-trimethoprim] High 02/14/2017    Diarrhea, Nausea and Vomiting Benadryl [Diphenhydramine Hcl] High 02/14/2017    Shortness of Breath, Swelling Codeine High 02/14/2017    Diarrhea, Itching, Nausea and Vomiting Diphenhydramine Hcl High 08/11/2015    Anaphylaxis Levaquin [Levofloxacin] High 02/14/2017    Rash Stephanie Busman Diphenhydramine Medium 02/14/2017    Itching Tylenol [Acetaminophen] Medium 02/14/2017    Itching Aspirin  08/05/2015    Hives Azithromycin  08/11/2015    Diarrhea Bactrim Ds [Sulfamethoxazole-trimethoprim]  08/11/2015    Nausea and Vomiting Codeine  08/05/2015    Nausea and Vomiting Tylenol [Acetaminophen]  08/05/2015    Hives, Itching Sneezing and slight itching, eyes water Current Immunizations  Never Reviewed Name Date Tdap 8/5/2015 11:55 PM  
  
 Not reviewed this visit You Were Diagnosed With   
  
 Codes Comments Closed fracture of sternum with routine healing, unspecified portion of sternum, subsequent encounter    -  Primary ICD-10-CM: S22.20XD ICD-9-CM: V54.19 Closed fracture of cervical vertebral body (HCC)     ICD-10-CM: S12. 9XXA ICD-9-CM: 805.00 Closed fracture of lumbar vertebra, unspecified fracture morphology, unspecified lumbar vertebral level, initial encounter ([facility] [place] [facility]     ICD-10-CM: P10.289C ICD-9-CM: 805.4 Vitals BP Pulse Temp Resp Height(growth percentile) Weight(growth percentile) (!) 151/91 99 96.8 °F (36 °C) 20 5' 7\" (1.702 m) 170 lb (77.1 kg) SpO2 BMI OB Status Smoking Status 98% 26.63 kg/m2 Hysterectomy Current Every Day Smoker BMI and BSA Data Body Mass Index Body Surface Area  
 26.63 kg/m 2 1.91 m 2 Preferred Pharmacy Pharmacy Name Phone [facility] [address] [place] [address] [phone] Your Updated Medication List  
  
   
This list is accurate as of: 3/22/17  1:26 PM.  Always use your most recent med list.  
  
  
  
  
 albuterol 90 mcg/actuation inhaler Commonly known as:  PROVENTIL HFA, VENTOLIN HFA, PROAIR HFA Take 1 Puff by inhalation every four (4) hours as needed for Wheezing. ALPRAZolam 0.5 mg tablet Commonly known as:  Pia Furnace Take 1 Tab by mouth three (3) times daily as needed for Anxiety. Max Daily Amount: 1.5 mg.  
  
 amitriptyline 25 mg tablet Commonly known as:  ELAVIL  
 Take  by mouth nightly. azithromycin 250 mg tablet Commonly known as:  Sheridan Collar Take 2 tablets today, then take 1 tablet daily  
  
 cyclobenzaprine 5 mg tablet Commonly known as:  FLEXERIL Take 1 Tab by mouth three (3) times daily as needed for Muscle Spasm(s). EPINEPHrine 0.3 mg/0.3 mL injection Commonly known as:  EPIPEN  
0.3 mL by IntraMUSCular route once as needed for up to 1 dose. * fluticasone 110 mcg/actuation inhaler Commonly known as:  FLOVENT HFA Take 1 Puff by inhalation every twelve (12) hours. * fluticasone 50 mcg/actuation nasal spray Commonly known as:  Moni Timmons 2 Sprays by Both Nostrils route daily. Indications: ALLERGIC RHINITIS  
  
 guaiFENesin  mg SR tablet Commonly known as:  Marty & Marty Take 1 Tab by mouth two (2) times a day. ibuprofen 800 mg tablet Commonly known as:  MOTRIN Take 1 Tab by mouth every eight (8) hours as needed for Pain. Indications: PAIN  
  
 LORazepam 0.5 mg tablet Commonly known as:  ATIVAN Take 1 Tab by mouth every one (1) hour as needed for Anxiety. Max Daily Amount: 12 mg.  
  
 methocarbamol 750 mg tablet Commonly known as:  ROBAXIN Take 1 Tab by mouth three (3) times daily as needed. morphine CR 15 mg CR tablet Commonly known as:  MS CONTIN Take 1 Tab by mouth two (2) times a day. Max Daily Amount: 30 mg. For chronic pain  
  
 oxyCODONE IR 5 mg immediate release tablet Commonly known as:  Oneil Yair Take 1 Tab by mouth every six (6) hours as needed for Pain. Max Daily Amount: 20 mg.  
  
 predniSONE 10 mg dose pack Commonly known as:  STERAPRED DS See administration instruction per 10mg dose pack QVAR 80 mcg/actuation [employer] Generic drug:  beclomethasone Take 2 Puffs by inhalation two (2) times a day. Indications: SEVERE CHRONIC OBSTRUCTIVE PULMONARY DISEASE  
  
 varenicline 0.5 mg (11)- 1 mg (42) Dspk Commonly known as:  CHANTIX STARTER MYESHA Sig: Use as directed * Notice: This list has 2 medication(s) that are the same as other medications prescribed for you. Read the directions carefully, and ask your doctor or other care provider to review them with you. Prescriptions Printed Refills  
 oxyCODONE IR (ROXICODONE) 5 mg immediate release tablet 0 Sig: Take 1 Tab by mouth every six (6) hours as needed for Pain. Max Daily Amount: 20 mg.  
 Class: Print Route: Oral  
  
We Performed the Following REFERRAL TO ORTHOPEDICS [[id number] Custom] Comments:  
 Please fax to [facility] Follow-up Instructions Return in about 4 weeks (around 4/19/2017) for pain, fractures, BP check. Referral Information Referral ID Referred By Referred To  
  
 [id number] Lisa Calderas Not Available Visits Status Start Date End Date 1 New Request 3/22/17 3/22/18 If your referral has a status of pending review or denied, additional information will be sent to support the outcome of this decision. Patient Instructions Oxycodone, Rapid Release (By mouth) Oxycodone Hydrochloride (la-v-LXO-done brian-droe-KLOR-lynn) Treats moderate to severe pain. This medicine is a narcotic pain reliever. Brand Name(s):ETH-Oxydose, Oxaydo, Oxy IR, Roxicodone There may be other brand names for this medicine. When This Medicine Should Not Be Used: This medicine is not right for everyone. Do not use it if you had an allergic reaction to oxycodone, codeine, hydrocodone, dihydrocodeine, or morphine. How to Use This Medicine:  
Capsule, Liquid, Tablet · Take your medicine as directed. Your dose may need to be changed several times to find what works best for you. · Measure the oral liquid medicine with a marked measuring spoon, oral syringe, or medicine cup. · Swallow the Oxaydo tablet whole with enough water to swallow it completely. Do not break, crush, chew, or dissolve it. Do not wet the tablet before you put it in your mouth. · Missed dose: Take a dose as soon as you remember. If it is almost time for your next dose, wait until then and take a regular dose. Do not take extra medicine to make up for a missed dose. · Store the medicine in a closed container at room temperature, away from heat, moisture, and direct light. Store the medicine in a secure place to prevent others from getting it. Ask your pharmacist about the best way to dispose of medicine you do not use. Drugs and Foods to Avoid: Ask your doctor or pharmacist before using any other medicine, including over-the-counter medicines, vitamins, and herbal products. · Some medicines can affect how oxycodone works. Tell your doctor if you are using any of the following: ¨ Amiodarone, quinidine ¨ MAO inhibitor (MAOI) ¨ Medicine to treat an infection (such as erythromycin, ketoconazole, rifampin) ¨ Medicine to treat HIV/AIDS (such as ritonavir) ¨ Medicine to treat depression or anxiety ¨ Medicine to treat seizures (such as carbamazepine, phenytoin) ¨ Phenothiazine medicine (such as chlorpromazine, perphenazine, prochlorperazine, promethazine, thioridazine) · Tell your doctor if you use anything else that makes you sleepy. Some examples are allergy medicine, narcotic pain medicine, and alcohol. Also tell your doctor if you are using buprenorphine, butorphanol, nalbuphine, pentazocine, or a muscle relaxer. · Do not drink alcohol while you are using this medicine. Warnings While Using This Medicine: · Tell your doctor if you are pregnant or breastfeeding, or if you have kidney disease, liver disease, heart disease, low blood pressure, lung disease or breathing problems (such as asthma, COPD), scoliosis, an enlarged prostate or trouble urinating, an underactive thyroid, [place] disease, gallbladder or pancreas problems, or stomach or bowel problems. Tell your doctor if you have a history of head injury, mental health problems, seizures, or alcohol or drug addiction. · This medicine may cause the following problems: 
¨ High risk of overdose, which can lead to death ¨ Respiratory depression (serious breathing problem that can be life-threatening) ¨ Low blood pressure · This medicine may make you dizzy, drowsy, or faint. Do not drive or do anything else that could be dangerous until you know how this medicine affects you. Sit or lie down if you feel dizzy. Stand up carefully. · This medicine may cause constipation, especially with long-term use. Ask your doctor if you should use a laxative to prevent and treat constipation. Drink plenty of liquids to help avoid constipation. · This medicine can be habit-forming. Do not use more than your prescribed dose. Call your doctor if you think your medicine is not working. · Do not stop using this medicine suddenly. Your doctor will need to slowly decrease your dose before you stop it completely. · Keep all medicine out of the reach of children. Never share your medicine with anyone. Possible Side Effects While Using This Medicine:  
Call your doctor right away if you notice any of these side effects: · Allergic reaction: Itching or hives, swelling in your face or hands, swelling or tingling in your mouth or throat, chest tightness, trouble breathing · Blue lips, fingernails, or skin, trouble breathing · Extreme dizziness or weakness, shallow breathing, slow heartbeat, sweating, cold or clammy skin, seizures · Lightheadedness, dizziness, fainting · Severe constipation If you notice these less serious side effects, talk with your doctor: · Mild constipation, nausea, or vomiting · Sleepiness, tiredness If you notice other side effects that you think are caused by this medicine, tell your doctor. Call your doctor for medical advice about side effects. You may report side effects to FDA at 3-544-FDA-5673 © 2016 [address] is for End User's use only and may not be sold, redistributed or otherwise used for commercial purposes. The above information is an  only. It is not intended as medical advice for individual conditions or treatments. Talk to your doctor, nurse or pharmacist before following any medical regimen to see if it is safe and effective for you. Introducing [facility]! Khoa Atkinson introduces [email or web address] patient portal. Now you can access parts of your medical record, email your doctor's office, and request medication refills online. 1. In your internet browser, go to [email or web address] 2. Click on the First Time User? Click Here link in the Sign In box. You will see the New Member Sign Up page. 3. Enter your [email or web address] Access Code exactly as it appears below. You will not need to use this code after youve completed the sign-up process. If you do not sign up before the expiration date, you must request a new code. · [email or web address] Access Code: [email or web address] Expires: 5/15/2017  3:40 PM 
 
4. Enter the last four digits of your Social Security Number (xxxx) and Date of Birth (mm/dd/yyyy) as indicated and click Submit. You will be taken to the next sign-up page. 5. Create a [email or web address] ID. This will be your [email or web address] login ID and cannot be changed, so think of one that is secure and easy to remember. 6. Create a [email or web address] password. You can change your password at any time. 7. Enter your Password Reset Question and Answer. This can be used at a later time if you forget your password. 8. Enter your e-mail address. You will receive e-mail notification when new information is available in [address] 9. Click Sign Up. You can now view and download portions of your medical record. 10. Click the Download Summary menu link to download a portable copy of your medical information.  
 
If you have questions, please visit the Frequently Asked Questions section of the [employer]. Remember, [employer] is NOT to be used for urgent needs. For medical emergencies, dial 911. Now available from your iPhone and Android! Please provide this summary of care documentation to your next provider. Your primary care clinician is listed as Sb Mcfadden. If you have any questions after today's visit, please call [phone].

## 2017-03-23 NOTE — TELEPHONE ENCOUNTER
Called patient to inform her that Dr Braeden max does not take [place].   Patient verbalized an  Understanding and stated that I could send referral over to Dr [place] office

## 2017-03-26 PROBLEM — K44.9 HIATAL HERNIA: Status: ACTIVE | Noted: 2017-03-26

## 2017-03-26 PROBLEM — Z79.899 CONTROLLED SUBSTANCE AGREEMENT SIGNED: Status: ACTIVE | Noted: 2017-03-22

## 2017-03-26 PROBLEM — E04.1 RIGHT THYROID NODULE: Status: ACTIVE | Noted: 2017-03-26
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
## 2017-03-28 ENCOUNTER — OFFICE VISIT (OUTPATIENT)
Dept: ORTHOPEDIC SURGERY | Age: 52
End: 2017-03-28

## 2017-03-28 VITALS
SYSTOLIC BLOOD PRESSURE: 130 MMHG | HEART RATE: 95 BPM | WEIGHT: 160 LBS | DIASTOLIC BLOOD PRESSURE: 62 MMHG | OXYGEN SATURATION: 98 % | BODY MASS INDEX: 25.11 KG/M2 | RESPIRATION RATE: 18 BRPM | HEIGHT: 67 IN | TEMPERATURE: 97.8 F

## 2017-03-28 DIAGNOSIS — R26.89 FUNCTIONAL GAIT ABNORMALITY: ICD-10-CM

## 2017-03-28 DIAGNOSIS — S22.21XA CLOSED FRACTURE OF MANUBRIUM, INITIAL ENCOUNTER: ICD-10-CM

## 2017-03-28 DIAGNOSIS — S32.010A COMPRESSION FRACTURE OF L1 LUMBAR VERTEBRA, CLOSED, INITIAL ENCOUNTER (HCC): Primary | ICD-10-CM

## 2017-03-28 DIAGNOSIS — G56.03 BILATERAL CARPAL TUNNEL SYNDROME: ICD-10-CM

## 2017-03-28 DIAGNOSIS — M62.830 MUSCLE SPASM OF BACK: ICD-10-CM

## 2017-03-28 DIAGNOSIS — F41.9 ANXIETY AND DEPRESSION: ICD-10-CM

## 2017-03-28 DIAGNOSIS — F32.A ANXIETY AND DEPRESSION: ICD-10-CM

## 2017-03-28 DIAGNOSIS — F17.210 NICOTINE DEPENDENCE, CIGARETTES, UNCOMPLICATED: ICD-10-CM

## 2017-03-28 DIAGNOSIS — R29.898 WEAKNESS OF BOTH ARMS: ICD-10-CM

## 2017-03-28 DIAGNOSIS — M54.2 NECK PAIN: ICD-10-CM
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
## 2017-03-28 NOTE — PATIENT INSTRUCTIONS
[facility] Activation    Thank you for requesting access to [facility]. Please follow the instructions below to securely access and download your online medical record. [facility] allows you to send messages to your doctor, view your test results, renew your prescriptions, schedule appointments, and more. How Do I Sign Up? 1. In your internet browser, go to [email or web address]  2. Click on the First Time User? Click Here link in the Sign In box. You will be redirect to the New Member Sign Up page. 3. Enter your [facility] [employer] Access Code exactly as it appears below. You will not need to use this code after youve completed the sign-up process. If you do not sign up before the expiration date, you must request a new code. [facility] Access Code: [email or web address]  Expires: 5/15/2017  3:40 PM (This is the date your [facility] [employer] access code will )    4. Enter the last four digits of your Social Security Number (xxxx) and Date of Birth (mm/dd/yyyy) as indicated and click Submit. You will be taken to the next sign-up page. 5. Create a [facility] ID. This will be your [facility] login ID and cannot be changed, so think of one that is secure and easy to remember. 6. Create a [facility] password. You can change your password at any time. 7. Enter your Password Reset Question and Answer. This can be used at a later time if you forget your password. 8. Enter your e-mail address. You will receive e-mail notification when new information is available in [address]. 9. Click Sign Up. You can now view and download portions of your medical record. 10. Click the Download Summary menu link to download a portable copy of your medical information. Additional Information    If you have questions, please visit the Frequently Asked Questions section of the [employer] website at [email or web address] "[employer]/. Remember, [facility] [employer] is NOT to be used for urgent needs. For medical emergencies, dial 911.          Learning About Benefits From Quitting Smoking  How does quitting smoking make you healthier? If you're thinking about quitting smoking, you may have a few reasons to be smoke-free. Your health may be one of them. · When you quit smoking, you lower your risks for cancer, lung disease, heart attack, stroke, blood vessel disease, and blindness from macular degeneration. · When you're smoke-free, you get sick less often, and you heal faster. You are less likely to get colds, flu, bronchitis, and pneumonia. · As a nonsmoker, you may find that your mood is better and you are less stressed. When and how will you feel healthier? Quitting has real health benefits that start from day 1 of being smoke-free. And the longer you stay smoke-free, the healthier you get and the better you feel. The first hours  · After just 20 minutes, your blood pressure and heart rate go down. That means there's less stress on your heart and blood vessels. · Within 12 hours, the level of carbon monoxide in your blood drops back to normal. That makes room for more oxygen. With more oxygen in your body, you may notice that you have more energy than when you smoked. After 2 weeks  · Your lungs start to work better. · Your risk of heart attack starts to drop. After 1 month  · When your lungs are clear, you cough less and breathe deeper, so it's easier to be active. · Your sense of taste and smell return. That means you can enjoy food more than you have since you started smoking. Over the years  · After 1 year, your risk of heart disease is half what it would be if you kept smoking. · After 5 years, your risk of stroke starts to shrink. Within a few years after that, it's about the same as if you'd never smoked. · After 10 years, your risk of dying from lung cancer is cut by about half. And your risk for many other types of cancer is lower too. How would quitting help others in your life?   When you quit smoking, you improve the health of everyone who now breathes in your smoke. · Their heart, lung, and cancer risks drop, much like yours. · They are sick less. For babies and small children, living smoke-free means they're less likely to have ear infections, pneumonia, and bronchitis. · If you're a woman who is or will be pregnant someday, quitting smoking means a healthier . · Children who are close to you are less likely to become adult smokers. Where can you learn more? Go to http://star-sofía.info/. Enter 052 [id number] 72 11 in the search box to learn more about \"Learning About Benefits From Quitting Smoking. \"  Current as of: May 26, 2016  Content Version: 11.1  © 7058-5675 [employer]. Care instructions adapted under license by [employer] (which disclaims liability or warranty for this information). If you have questions about a medical condition or this instruction, always ask your healthcare professional. [place] [address] any warranty or liability for your use of this information. Stopping Smoking: Care Instructions  Your Care Instructions  Cigarette smokers crave the nicotine in cigarettes. Giving it up is much harder than simply changing a habit. Your body has to stop craving the nicotine. It is hard to quit, but you can do it. There are many tools that people use to quit smoking. You may find that combining tools works best for you. There are several steps to quitting. First you get ready to quit. Then you get support to help you. After that, you learn new skills and behaviors to become a nonsmoker. For many people, a necessary step is getting and using medicine. Your doctor will help you set up the plan that best meets your needs. You may want to attend a smoking cessation program to help you quit smoking. When you choose a program, look for one that has proven success. Ask your doctor for ideas.  You will greatly increase your chances of success if you take medicine as well as get counseling or join a cessation program.  Some of the changes you feel when you first quit tobacco are uncomfortable. Your body will miss the nicotine at first, and you may feel short-tempered and grumpy. You may have trouble sleeping or concentrating. Medicine can help you deal with these symptoms. You may struggle with changing your smoking habits and rituals. The last step is the tricky one: Be prepared for the smoking urge to continue for a time. This is a lot to deal with, but keep at it. You will feel better. Follow-up care is a key part of your treatment and safety. Be sure to make and go to all appointments, and call your doctor if you are having problems. Its also a good idea to know your test results and keep a list of the medicines you take. How can you care for yourself at home? · Ask your family, friends, and coworkers for support. You have a better chance of quitting if you have help and support. · Join a support group, such as Nicotine Anonymous, for people who are trying to quit smoking. · Consider signing up for a smoking cessation program, such as the American Lung Association's Freedom from Smoking program.  · Set a quit date. Pick your date carefully so that it is not right in the middle of a big deadline or stressful time. Once you quit, do not even take a puff. Get rid of all ashtrays and lighters after your last cigarette. Clean your house and your clothes so that they do not smell of smoke. · Learn how to be a nonsmoker. Think about ways you can avoid those things that make you reach for a cigarette. ¨ Avoid situations that put you at greatest risk for smoking. For some people, it is hard to have a drink with friends without smoking. For others, they might skip a coffee break with coworkers who smoke. ¨ Change your daily routine. Take a different route to work or eat a meal in a different place. · Cut down on stress.  Calm yourself or release tension by doing an activity you enjoy, such as reading a book, taking a hot bath, or gardening. · Talk to your doctor or pharmacist about nicotine replacement therapy, which replaces the nicotine in your body. You still get nicotine but you do not use tobacco. Nicotine replacement products help you slowly reduce the amount of nicotine you need. These products come in several forms, many of them available over-the-counter:  ¨ Nicotine patches  ¨ Nicotine gum and lozenges  ¨ Nicotine inhaler  · Ask your doctor about bupropion (Wellbutrin) or varenicline (Chantix), which are prescription medicines. They do not contain nicotine. They help you by reducing withdrawal symptoms, such as stress and anxiety. · Some people find hypnosis, acupuncture, and massage helpful for ending the smoking habit. · Eat a healthy diet and get regular exercise. Having healthy habits will help your body move past its craving for nicotine. · Be prepared to keep trying. Most people are not successful the first few times they try to quit. Do not get mad at yourself if you smoke again. Make a list of things you learned and think about when you want to try again, such as next week, next month, or next year. Where can you learn more? Go to http://star-sofía.info/. Enter F845 in the search box to learn more about \"Stopping Smoking: Care Instructions. \"  Current as of: May 26, 2016  Content Version: 11.1  © 8847-7692 [employer]. Care instructions adapted under license by [employer] (which disclaims liability or warranty for this information). If you have questions about a medical condition or this instruction, always ask your healthcare professional. [place] [address] any warranty or liability for your use of this information. Carpal Tunnel Syndrome: Exercises  Your Care Instructions  Here are some examples of typical rehabilitation exercises for your condition. Start each exercise slowly.  Ease off the exercise if you start to have pain. Your doctor or your physical or occupational therapist will tell you when you can start these exercises and which ones will work best for you. How to do the exercises  Note: When you no longer have pain or numbness, you can do exercises to help prevent carpal tunnel syndrome from coming back. Do not do any stretch or movement that is uncomfortable or painful. Warm-up stretches  1. Rotate your wrist up, down, and from side to side. Repeat 4 times. 2. Stretch your fingers far apart. Relax them, and then stretch them again. Repeat 4 times. 3. Stretch your thumb by pulling it back gently, holding it, and then releasing it. Repeat 4 times. Prayer stretch    1. Start with your palms together in front of your chest just below your chin. 2. Slowly lower your hands toward your waistline, keeping your hands close to your stomach and your palms together until you feel a mild to moderate stretch under your forearms. 3. Hold for at least 15 to 30 seconds. Repeat 2 to 4 times. Wrist flexor stretch    1. Extend your arm in front of you with your palm up. 2. Bend your wrist, pointing your hand toward the floor. 3. With your other hand, gently bend your wrist farther until you feel a mild to moderate stretch in your forearm. 4. Hold for at least 15 to 30 seconds. Repeat 2 to 4 times. Wrist extensor stretch    Repeat steps 1 through 4 of the stretch above, but begin with your extended hand palm down. Follow-up care is a key part of your treatment and safety. Be sure to make and go to all appointments, and call your doctor if you are having problems. It's also a good idea to know your test results and keep a list of the medicines you take. Where can you learn more? Go to http://star-sofía.info/. Enter T603 in the search box to learn more about \"Carpal Tunnel Syndrome: Exercises. \"  Current as of: May 23, 2016  Content Version: 11.1  © 2735-3166 [employer], [facility].  Care instructions adapted under license by [employer] (which disclaims liability or warranty for this information). If you have questions about a medical condition or this instruction, always ask your healthcare professional. [address] any warranty or liability for your use of this information. Neck Arthritis: Exercises  Your Care Instructions  Here are some examples of typical rehabilitation exercises for your condition. Start each exercise slowly. Ease off the exercise if you start to have pain. Your doctor or physical therapist will tell you when you can start these exercises and which ones will work best for you. How to do the exercises  Neck stretches to the side    4. This stretch works best if you keep your shoulder down as you lean away from it. To help you remember to do this, start by relaxing your shoulders and lightly holding on to your thighs or your chair. 5. Tilt your head toward your shoulder and hold for 15 to 30 seconds. Let the weight of your head stretch your muscles. 6. Repeat 2 to 4 times toward each shoulder. Chin tuck    4. Lie on the floor with a rolled-up towel under your neck. Your head should be touching the floor. 5. Slowly bring your chin toward your chest.  6. Hold for a count of 6, and then relax for up to 10 seconds. 7. Repeat 8 to 12 times. Active cervical rotation    5. Sit in a firm chair, or stand up straight. 6. Keeping your chin level, turn your head to the right, and hold for 15 to 30 seconds. 7. Turn your head to the left and hold for 15 to 30 seconds. 8. Repeat 2 to 4 times to each side. Shoulder blade squeeze    1. While standing, squeeze your shoulder blades together. 2. Do not raise your shoulders up as you are squeezing. 3. Hold for 6 seconds. 4. Repeat 8 to 12 times. Shoulder rolls    1. Sit comfortably with your feet shoulder-width apart. You can also do this exercise standing up.   2. Roll your shoulders up, then back, and then down in a smooth, circular motion. 3. Repeat 2 to 4 times. Follow-up care is a key part of your treatment and safety. Be sure to make and go to all appointments, and call your doctor if you are having problems. It's also a good idea to know your test results and keep a list of the medicines you take. Where can you learn more? Go to http://star-sofía.info/. Enter G782 in the search box to learn more about \"Neck Arthritis: Exercises. \"  Current as of: May 23, 2016  Content Version: 11.1  © 8812-0716 [employer]. Care instructions adapted under license by [employer] (which disclaims liability or warranty for this information). If you have questions about a medical condition or this instruction, always ask your healthcare professional. [address] any warranty or liability for your use of this information.

## 2017-03-29 ENCOUNTER — TELEPHONE (OUTPATIENT)
Dept: INTERNAL MEDICINE CLINIC | Age: 52
End: 2017-03-29
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
## 2017-03-31 NOTE — TELEPHONE ENCOUNTER
Referral done for Dr. Cyndee Tran  99 visits 03/31/17-03/31/18  TVXM#[phone]  Faxed to [phone]  Copy mailed to joleen jacques

## 2017-04-03 ENCOUNTER — TELEPHONE (OUTPATIENT)
Dept: INTERNAL MEDICINE CLINIC | Age: 52
End: 2017-04-03

## 2017-04-03 DIAGNOSIS — S06.0X0A CONCUSSION, WITHOUT LOC, INITIAL ENCOUNTER: ICD-10-CM

## 2017-04-03 DIAGNOSIS — S22.20XS CLOSED FRACTURE OF STERNUM, UNSPECIFIED PORTION OF STERNUM, SEQUELA: Primary | ICD-10-CM

## 2017-04-03 NOTE — TELEPHONE ENCOUNTER
Referrals placed.     Dr. Randall Agosto  [facility], [address] [place] [address] [address]  Phone: [phone]  Fax: [phone]

## 2017-04-03 NOTE — TELEPHONE ENCOUNTER
Call from Darling Almanza at [facility], she states tht pt now needs referrals to a neurologist and a cardiac thoracic surgeon.   They are recommending Dr. Quinn Gage as a neurologist for concussion short term memory [phone] and [facility] for fractured sternum [phone], Jericho Hawley

## 2017-04-10 ENCOUNTER — TELEPHONE (OUTPATIENT)
Dept: INTERNAL MEDICINE CLINIC | Age: 52
End: 2017-04-10

## 2017-04-10 DIAGNOSIS — S22.20XS: Primary | ICD-10-CM

## 2017-04-10 DIAGNOSIS — S42.309S: Primary | ICD-10-CM

## 2017-04-10 NOTE — TELEPHONE ENCOUNTER
DR Fátima Pearl -[phone]  Card[facility] at [facility]- [phone]    Take her ins- she says she spoke with someone about a referral

## 2017-04-11 ENCOUNTER — TELEPHONE (OUTPATIENT)
Dept: CARDIOTHORACIC SURGERY | Age: 52
End: 2017-04-11

## 2017-04-11 NOTE — TELEPHONE ENCOUNTER
Spoke with patient and  Confirmed  appointment change  From : Thursday @ 1:30  To tomorrow : Wed. 04/12/17 @     10:30 am.

## 2017-04-18 ENCOUNTER — HOSPITAL ENCOUNTER (OUTPATIENT)
Dept: NON INVASIVE DIAGNOSTICS | Age: 52
Discharge: HOME | End: 2017-04-18
Attending: THORACIC SURGERY (CARDIOTHORACIC VASCULAR SURGERY)
Payer: MEDICARE

## 2017-04-18 ENCOUNTER — OFFICE VISIT (OUTPATIENT)
Dept: CARDIOTHORACIC SURGERY | Age: 52
End: 2017-04-18

## 2017-04-18 ENCOUNTER — TELEPHONE (OUTPATIENT)
Dept: CARDIOTHORACIC SURGERY | Age: 52
End: 2017-04-18

## 2017-04-18 VITALS
HEART RATE: 79 BPM | WEIGHT: 169.5 LBS | SYSTOLIC BLOOD PRESSURE: 127 MMHG | OXYGEN SATURATION: 97 % | DIASTOLIC BLOOD PRESSURE: 82 MMHG | HEIGHT: 67 IN | BODY MASS INDEX: 26.6 KG/M2 | RESPIRATION RATE: 20 BRPM | TEMPERATURE: 98 F

## 2017-04-18 DIAGNOSIS — S22.22XA CLOSED FRACTURE OF BODY OF STERNUM, INITIAL ENCOUNTER: Primary | ICD-10-CM

## 2017-04-18 DIAGNOSIS — S22.22XA CLOSED FRACTURE OF BODY OF STERNUM, INITIAL ENCOUNTER: ICD-10-CM

## 2017-04-18 PROCEDURE — 93306 TTE W/DOPPLER COMPLETE: CPT
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
## 2017-04-18 NOTE — TELEPHONE ENCOUNTER
Called [employer] to obtain auth for pt to have 2D Echo as ordered by Dr. Bindu Chambers during pt's clinic visit. S/w Johann Santo w/ medical management. Clinical information given to assist w/ getting auth for Echo. Auth received. Auth # [id number].

## 2017-04-18 NOTE — PROGRESS NOTES
Laura Hook is a 46 y.o. female referred by Dr Stephen Verde to see Dr. Arsalan Kramer for sternal fracture. Advanced directive: No    Medications not taking or discontinued: See Med Rec    1. Have you been to the ER, urgent care clinic since your last visit? Hospitalized since your last visit? No    2. Have you seen or consulted any other health care providers outside of the [address] [place] [address] since your last visit? Include any pap smears or colon screening. No    Ms. Katie Brody has a reminder for a \"due or due soon\" health maintenance. I have asked that she contact her primary care provider for follow-up on this health maintenance.
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
## 2017-04-19 ENCOUNTER — OFFICE VISIT (OUTPATIENT)
Dept: INTERNAL MEDICINE CLINIC | Age: 52
End: 2017-04-19

## 2017-04-19 ENCOUNTER — TELEPHONE (OUTPATIENT)
Dept: INTERNAL MEDICINE CLINIC | Age: 52
End: 2017-04-19

## 2017-04-19 VITALS
WEIGHT: 168.6 LBS | SYSTOLIC BLOOD PRESSURE: 143 MMHG | TEMPERATURE: 96.5 F | HEIGHT: 67 IN | HEART RATE: 74 BPM | OXYGEN SATURATION: 97 % | DIASTOLIC BLOOD PRESSURE: 88 MMHG | BODY MASS INDEX: 26.46 KG/M2 | RESPIRATION RATE: 14 BRPM

## 2017-04-19 DIAGNOSIS — M54.41 CHRONIC MIDLINE LOW BACK PAIN WITH BILATERAL SCIATICA: ICD-10-CM

## 2017-04-19 DIAGNOSIS — F32.9 SINGLE CURRENT EPISODE OF MAJOR DEPRESSIVE DISORDER, UNSPECIFIED DEPRESSION EPISODE SEVERITY: Primary | ICD-10-CM

## 2017-04-19 DIAGNOSIS — R23.3 EASY BRUISING: ICD-10-CM

## 2017-04-19 DIAGNOSIS — Z13.220 SCREENING FOR HYPERLIPIDEMIA: ICD-10-CM

## 2017-04-19 DIAGNOSIS — M54.42 CHRONIC MIDLINE LOW BACK PAIN WITH BILATERAL SCIATICA: ICD-10-CM

## 2017-04-19 DIAGNOSIS — G89.29 CHRONIC MIDLINE LOW BACK PAIN WITH BILATERAL SCIATICA: ICD-10-CM

## 2017-04-19 DIAGNOSIS — Z11.59 NEED FOR HEPATITIS C SCREENING TEST: ICD-10-CM

## 2017-04-19 RX ORDER — CITALOPRAM 20 MG/1
20 TABLET, FILM COATED ORAL DAILY
Qty: 30 TAB | Refills: 1 | Status: SHIPPED | OUTPATIENT
Start: 2017-04-19 | End: 2017-06-01 | Stop reason: ALTCHOICE

## 2017-04-19 RX ORDER — OXYCODONE AND ACETAMINOPHEN 7.5; 325 MG/1; MG/1
1 TABLET ORAL
Qty: 120 TAB | Refills: 0 | Status: SHIPPED | OUTPATIENT
Start: 2017-04-19 | End: 2017-04-20 | Stop reason: ALTCHOICE

## 2017-04-19 NOTE — TELEPHONE ENCOUNTER
Minerva Gibson called from [address] she is the pharmacist there she called and stated tht they can fill rx's for emergencies on an emergency fill basis , she needs to spk to [facility], [phone]

## 2017-04-19 NOTE — PATIENT INSTRUCTIONS
Pain Medicine: Care Instructions  Your Care Instructions  Pain can keep you from doing the things you want to do. Medicine may help you feel better. There are many kinds of pain medicine. One type you can buy over the counter is acetaminophen (Tylenol). Other medicines help both pain and swelling. These are called nonsteroidal anti-inflammatory drugs (NSAIDs). They include aspirin, ibuprofen (Advil, Motrin), and naproxen (Aleve). All of these drugs can cause side effects. Take them just as the package label tells you to. The most common side effects are stomach upset, heartburn, and nausea. Taking these drugs with food may help. If you take NSAIDs often, you could get stomach ulcers or kidney problems. This can also happen if you take them for a long time. NSAIDs rarely cause a bad allergic reaction. Many pain medicines need to be prescribed by a doctor. Some of these drugs, called opioids, can be addicting. Examples are hydrocodone, morphine, fentanyl, and codeine. They often can be used safely if you are under a doctor's care. Follow-up care is a key part of your treatment and safety. Be sure to make and go to all appointments, and call your doctor if you are having problems. It's also a good idea to know your test results and keep a list of the medicines you take. How can you care for yourself at home? · Be safe with medicines. If you take an over-the-counter pain medicine, such as acetaminophen (Tylenol), ibuprofen (Advil, Motrin), or naproxen (Aleve), read and follow all instructions on the label. · Do not give aspirin to anyone younger than 20. It has been linked to Reye syndrome, a serious illness. · Be careful when taking over-the-counter cold or flu medicines and Tylenol at the same time. Many of these medicines contain acetaminophen, which is Tylenol. Read the labels to make sure that you are not taking more than the recommended dose. Too much Tylenol can be harmful.   · Do not take two or more pain medicines at the same time unless the doctor told you to. · Do not drink alcohol and take pain medicine at the same time. · If your pain pills make you constipated:  ¨ Talk to your doctor about a laxative. ¨ Drink plenty of fluids, enough so that your urine is light yellow or clear like water. Drink water, fruit juice, or other drinks that do not contain caffeine or alcohol. If you have kidney, heart, or liver disease and have to limit fluids, talk with your doctor before you increase the amount of fluids you drink. ¨ Take fiber, such as Citrucel or Metamucil, daily if needed. Read and follow all instructions on the label. If you take pain medicine for more than a few days, talk to your doctor before you take fiber. When should you call for help? Call your doctor now or seek immediate medical care if:  · Your pain medicine is not easing your pain. · You have stomach pain, an upset stomach, or heartburn that lasts or comes back. · You can't sleep because of the pain. Watch closely for changes in your health, and be sure to contact your doctor if:  · You do not get better as expected. Where can you learn more? Go to http://star-sofía.info/. Enter [phone] in the search box to learn more about \"Pain Medicine: Care Instructions. \"  Current as of: November 15, 2016  Content Version: 11.2  © 7167-5972 [employer]. Care instructions adapted under license by [employer] (which disclaims liability or warranty for this information). If you have questions about a medical condition or this instruction, always ask your healthcare professional. [place] [address] any warranty or liability for your use of this information.   Health Maintenance Due   Topic Date Due    Hepatitis C Screening  1965    COLONOSCOPY  08/11/1981    Pneumococcal 19-64 Medium Risk (1 of 1 - PPSV23) 08/11/1982    BREAST CANCER SCRN MAMMOGRAM  08/11/2013    INFLUENZA AGE 9 TO ADULT  08/01/2016

## 2017-04-19 NOTE — PROGRESS NOTES
Chief Complaint   Patient presents with    Depression     follow up     Anxiety     follow up     Referral Follow Up     Cardiology Surgeon Dr Abdias Arguello, [address] [facility] Trauma Surgeon at [facility], patient will be going to see Neurologist May 1st at [facility]rology     1. Have you been to the ER, urgent care clinic since your last visit? Hospitalized since your last visit? No    2. Have you seen or consulted any other health care providers outside of the [address] [place] [address] since your last visit? Include any pap smears or colon screening.  No
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
## 2017-04-19 NOTE — MR AVS SNAPSHOT
Visit Information Date & Time Provider Department Dept. Phone Encounter #  
 4/19/2017  2:30 PM Lizy Montes De Oca MD Internist of [address] [place] [address] Place [id number] Follow-up Instructions Return in about 4 weeks (around 5/17/2017). Your Appointments 5/3/2017  8:45 AM  
LAB with [facility] NURSE VISIT Internist of [facility] ([address]) Appt Note: lab  
 [address], [address] [place] [address] [place]  
  
   
 [address] [facility] Upcoming Health Maintenance Date Due Hepatitis C Screening 1965 COLONOSCOPY 8/11/1981 Pneumococcal 19-64 Medium Risk (1 of 1 - PPSV23) 8/11/1982 BREAST CANCER SCRN MAMMOGRAM 8/11/2013 INFLUENZA AGE 9 TO ADULT 8/1/2016 PAP AKA CERVICAL CYTOLOGY 2/14/2020 DTaP/Tdap/Td series (2 - Td) 8/5/2025 Allergies as of 4/19/2017  Review Complete On: 4/19/2017 By: Lisa Craig Severity Noted Reaction Type Reactions Latex, Natural Rubber Medium 02/14/2017    Rash Latex, Natural Rubber  08/11/2015    Hives Amoxicillin High 08/05/2015    Anaphylaxis Amoxicillin High 02/14/2017    Shortness of Breath, Swelling Aspirin High 02/14/2017    Itching Bactrim Ds [Sulfamethoxazole-trimethoprim] High 02/14/2017    Diarrhea, Nausea and Vomiting Benadryl [Diphenhydramine Hcl] High 02/14/2017    Shortness of Breath, Swelling Codeine High 02/14/2017    Diarrhea, Itching, Nausea and Vomiting Diphenhydramine Hcl High 08/11/2015    Anaphylaxis Levaquin [Levofloxacin] High 02/14/2017    Rash Gerre Tamayo Diphenhydramine Medium 02/14/2017    Itching Penicillins Medium 01/01/2013    Hives Tylenol [Acetaminophen] Medium 02/14/2017    Itching Aspirin  08/05/2015    Hives Azithromycin  08/11/2015    Other (comments)  PATIENT STATED SHE CAN TOLERATE Z-MYESHA AS OF 11/2016 OR 1/2017 WHEN SHE RECEIVED IT FOR URI  
 Bactrim Ds [Sulfamethoxazole-trimethoprim]  08/11/2015    Nausea and Vomiting Codeine  08/05/2015    Nausea and Vomiting Tylenol [Acetaminophen]  08/05/2015    Hives, Itching Sneezing and slight itching, eyes water Current Immunizations  Never Reviewed Name Date Tdap 8/5/2015 11:55 PM  
  
 Not reviewed this visit You Were Diagnosed With   
  
 Codes Comments Single current episode of major depressive disorder, unspecified depression episode severity    -  Primary ICD-10-CM: F32.9 ICD-9-CM: 296.20 Chronic midline low back pain with bilateral sciatica     ICD-10-CM: M54.41, M54.42, G89.29 ICD-9-CM: 724.2, 724.3, 338.29 Easy bruising     ICD-10-CM: R23.8 ICD-9-CM: 782.9 Screening for hyperlipidemia     ICD-10-CM: Z13.220 ICD-9-CM: V77.91 Need for hepatitis C screening test     ICD-10-CM: Z11.59 
ICD-9-CM: V73.89 Vitals BP Pulse Temp Resp Height(growth percentile) Weight(growth percentile) 143/88 (BP 1 Location: Left arm, BP Patient Position: Sitting) 74 96.5 °F (35.8 °C) (Oral) 14 5' 7\" (1.702 m) 168 lb 9.6 oz (76.5 kg) SpO2 BMI OB Status Smoking Status 97% 26.41 kg/m2 Hysterectomy Current Every Day Smoker BMI and BSA Data Body Mass Index Body Surface Area  
 26.41 kg/m 2 1.9 m 2 Preferred Pharmacy Pharmacy Name Phone [facility] [address] [place] [address] [phone] Your Updated Medication List  
  
   
This list is accurate as of: 4/19/17  3:15 PM.  Always use your most recent med list.  
  
  
  
  
 albuterol 90 mcg/actuation inhaler Commonly known as:  PROVENTIL HFA, VENTOLIN HFA, PROAIR HFA Take 1 Puff by inhalation every four (4) hours as needed for Wheezing. CENTRUM SILVER ULTRA WOMEN'S Tab tablet Generic drug:  multivit-mineral-iron-lutein Take 1 Tab by mouth daily. citalopram 20 mg tablet Commonly known as:  Stephanie Walden Take 1 Tab by mouth daily. cyclobenzaprine 5 mg tablet Commonly known as:  FLEXERIL Take 1 Tab by mouth three (3) times daily as needed for Muscle Spasm(s). EPINEPHrine 0.3 mg/0.3 mL injection Commonly known as:  EPIPEN  
0.3 mL by IntraMUSCular route once as needed for up to 1 dose. * fluticasone 110 mcg/actuation inhaler Commonly known as:  FLOVENT HFA Take 1 Puff by inhalation every twelve (12) hours. * fluticasone 50 mcg/actuation nasal spray Commonly known as:  Reymundo Marrow 2 Sprays by Both Nostrils route daily. Indications: ALLERGIC RHINITIS  
  
 oxyCODONE-acetaminophen 7.5-325 mg per tablet Commonly known as:  PERCOCET 7.5 Take 1 Tab by mouth every six (6) hours as needed for Pain. Max Daily Amount: 4 Tabs. * Notice: This list has 2 medication(s) that are the same as other medications prescribed for you. Read the directions carefully, and ask your doctor or other care provider to review them with you. Prescriptions Printed Refills  
 oxyCODONE-acetaminophen (PERCOCET 7.5) 7.5-325 mg per tablet 0 Sig: Take 1 Tab by mouth every six (6) hours as needed for Pain. Max Daily Amount: 4 Tabs. Class: Print Route: Oral  
  
Prescriptions Sent to Pharmacy Refills  
 citalopram (CELEXA) 20 mg tablet 1 Sig: Take 1 Tab by mouth daily. Class: Normal  
 Pharmacy: [address] [place] [address] #: [phone] Route: Oral  
  
Follow-up Instructions Return in about 4 weeks (around 5/17/2017). To-Do List   
 04/19/2017 Lab:  CBC WITH AUTOMATED DIFF   
  
 04/19/2017 Lab:  CBC WITH AUTOMATED DIFF   
  
 04/19/2017 Lab:  HEPATITIS C AB   
  
 04/19/2017 Lab:  LIPID PANEL Around 04/19/2017 Lab:  TSH AND FREE T4 Patient Instructions Pain Medicine: Care Instructions Your Care Instructions Pain can keep you from doing the things you want to do.  Medicine may help you feel better. There are many kinds of pain medicine. One type you can buy over the counter is acetaminophen (Tylenol). Other medicines help both pain and swelling. These are called nonsteroidal anti-inflammatory drugs (NSAIDs). They include aspirin, ibuprofen (Advil, Motrin), and naproxen (Aleve). All of these drugs can cause side effects. Take them just as the package label tells you to. The most common side effects are stomach upset, heartburn, and nausea. Taking these drugs with food may help. If you take NSAIDs often, you could get stomach ulcers or kidney problems. This can also happen if you take them for a long time. NSAIDs rarely cause a bad allergic reaction. Many pain medicines need to be prescribed by a doctor. Some of these drugs, called opioids, can be addicting. Examples are hydrocodone, morphine, fentanyl, and codeine. They often can be used safely if you are under a doctor's care. Follow-up care is a key part of your treatment and safety. Be sure to make and go to all appointments, and call your doctor if you are having problems. It's also a good idea to know your test results and keep a list of the medicines you take. How can you care for yourself at home? · Be safe with medicines. If you take an over-the-counter pain medicine, such as acetaminophen (Tylenol), ibuprofen (Advil, Motrin), or naproxen (Aleve), read and follow all instructions on the label. · Do not give aspirin to anyone younger than 20. It has been linked to Reye syndrome, a serious illness. · Be careful when taking over-the-counter cold or flu medicines and Tylenol at the same time. Many of these medicines contain acetaminophen, which is Tylenol. Read the labels to make sure that you are not taking more than the recommended dose. Too much Tylenol can be harmful. · Do not take two or more pain medicines at the same time unless the doctor told you to. · Do not drink alcohol and take pain medicine at the same time. · If your pain pills make you constipated: ¨ Talk to your doctor about a laxative. ¨ Drink plenty of fluids, enough so that your urine is light yellow or clear like water. Drink water, fruit juice, or other drinks that do not contain caffeine or alcohol. If you have kidney, heart, or liver disease and have to limit fluids, talk with your doctor before you increase the amount of fluids you drink. ¨ Take fiber, such as Citrucel or Metamucil, daily if needed. Read and follow all instructions on the label. If you take pain medicine for more than a few days, talk to your doctor before you take fiber. When should you call for help? Call your doctor now or seek immediate medical care if: 
· Your pain medicine is not easing your pain. · You have stomach pain, an upset stomach, or heartburn that lasts or comes back. · You can't sleep because of the pain. Watch closely for changes in your health, and be sure to contact your doctor if: 
· You do not get better as expected. Where can you learn more? Go to http://star-sofía.info/. Enter [phone] in the search box to learn more about \"Pain Medicine: Care Instructions. \" Current as of: November 15, 2016 Content Version: 11.2 © 6089-7828 [employer]. Care instructions adapted under license by [employer] (which disclaims liability or warranty for this information). If you have questions about a medical condition or this instruction, always ask your healthcare professional. [place] [address] any warranty or liability for your use of this information. Health Maintenance Due Topic Date Due  
 Hepatitis C Screening  1965  COLONOSCOPY  08/11/1981  Pneumococcal 19-64 Medium Risk (1 of 1 - PPSV23) 08/11/1982  BREAST CANCER SCRN MAMMOGRAM  08/11/2013  INFLUENZA AGE 9 TO ADULT  08/01/2016 Introducing [facility]!    
 [employer] introduces [employer] patient portal. Now you can access parts of your medical record, email your doctor's office, and request medication refills online. 1. In your internet browser, go to [email or web address] 2. Click on the First Time User? Click Here link in the Sign In box. You will see the New Member Sign Up page. 3. Enter your [employer] Access Code exactly as it appears below. You will not need to use this code after youve completed the sign-up process. If you do not sign up before the expiration date, you must request a new code. · [employer] Access Code: [email or web address] Expires: 5/15/2017  3:40 PM 
 
4. Enter the last four digits of your Social Security Number (xxxx) and Date of Birth (mm/dd/yyyy) as indicated and click Submit. You will be taken to the next sign-up page. 5. Create a [employer] ID. This will be your [employer] login ID and cannot be changed, so think of one that is secure and easy to remember. 6. Create a [employer] password. You can change your password at any time. 7. Enter your Password Reset Question and Answer. This can be used at a later time if you forget your password. 8. Enter your e-mail address. You will receive e-mail notification when new information is available in [address] 9. Click Sign Up. You can now view and download portions of your medical record. 10. Click the Download Summary menu link to download a portable copy of your medical information. If you have questions, please visit the Frequently Asked Questions section of the [employer] website. Remember, [employer] is NOT to be used for urgent needs. For medical emergencies, dial 911. Now available from your iPhone and Android! Please provide this summary of care documentation to your next provider. Your primary care clinician is listed as Stefan Harry. If you have any questions after today's visit, please call [phone].

## 2017-04-19 NOTE — TELEPHONE ENCOUNTER
Pt called and stated tht her rx for percocet has acetaminophen in it and she is allergic, she said it should show on her allergies, she didn't realize until she got in her vehicle, pls Rolette Last

## 2017-04-20 RX ORDER — OXYCODONE HYDROCHLORIDE 10 MG/1
10 TABLET ORAL
Qty: 120 TAB | Refills: 0 | Status: SHIPPED | OUTPATIENT
Start: 2017-04-20 | End: 2017-09-28 | Stop reason: ALTCHOICE
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
## 2017-04-20 NOTE — TELEPHONE ENCOUNTER
I phoned the pt. I told the patient to stop taking the Percocet that has Tylenol in it. Patient has not taken any Percocet tabs. Personally spoke with the pharmacist who will refill the patient's money for the Percocet prescription and agreed to dispose of her tablets. The patient was instructed to take the Percocet prescription to the pharmacy for refined and for them to personally dispose of this medication. Additionally, the pharmacist told me that they do not carry 7.5 mg tablets of oxycodone. They do have him stop 10 mg tablets. I will refill the patient's oxycodone at 10 mg tablets in place of her original 5 mg tablet dose. I discussed the need for the patient to be cautious as she takes is higher dose. The patient agreed to take it at home in the presence of her . All questions were answered.     Dr. Elder Jha  [facility] [place] [address] [facility].  Phone: [phone]  Fax: [phone]

## 2017-05-03 ENCOUNTER — OFFICE VISIT (OUTPATIENT)
Dept: ORTHOPEDIC SURGERY | Age: 52
End: 2017-05-03

## 2017-05-03 VITALS
WEIGHT: 168 LBS | BODY MASS INDEX: 26.37 KG/M2 | SYSTOLIC BLOOD PRESSURE: 140 MMHG | HEART RATE: 76 BPM | HEIGHT: 67 IN | TEMPERATURE: 97.8 F | RESPIRATION RATE: 18 BRPM | DIASTOLIC BLOOD PRESSURE: 86 MMHG | OXYGEN SATURATION: 94 %

## 2017-05-03 DIAGNOSIS — S22.20XD CLOSED FRACTURE OF STERNUM WITH ROUTINE HEALING, UNSPECIFIED PORTION OF STERNUM, SUBSEQUENT ENCOUNTER: ICD-10-CM

## 2017-05-03 DIAGNOSIS — R26.9 GAIT ABNORMALITY: ICD-10-CM

## 2017-05-03 DIAGNOSIS — S39.012D LUMBAR STRAIN, SUBSEQUENT ENCOUNTER: ICD-10-CM

## 2017-05-03 DIAGNOSIS — F17.210 NICOTINE DEPENDENCE, CIGARETTES, UNCOMPLICATED: ICD-10-CM
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
## 2017-05-03 DIAGNOSIS — S16.1XXD CERVICAL STRAIN, SUBSEQUENT ENCOUNTER: ICD-10-CM

## 2017-05-03 DIAGNOSIS — Z98.890 HISTORY OF NECK SURGERY: ICD-10-CM

## 2017-05-03 DIAGNOSIS — S32.010D COMPRESSION FRACTURE OF L1 LUMBAR VERTEBRA, WITH ROUTINE HEALING, SUBSEQUENT ENCOUNTER: Primary | ICD-10-CM

## 2017-05-03 PROBLEM — S39.012A LUMBAR STRAIN: Status: ACTIVE | Noted: 2017-05-03
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
## 2017-05-08 ENCOUNTER — HOSPITAL ENCOUNTER (OUTPATIENT)
Dept: LAB | Age: 52
Discharge: HOME | End: 2017-05-08

## 2017-05-08 DIAGNOSIS — M54.12 CERVICAL RADICULOPATHY: ICD-10-CM

## 2017-05-08 PROCEDURE — 99001 SPECIMEN HANDLING PT-LAB: CPT | Performed by: INTERNAL MEDICINE

## 2017-05-09 LAB
BASOPHILS # BLD AUTO: 0 X10E3/UL (ref 0–0.2)
BASOPHILS NFR BLD AUTO: 0 %
CHOLEST SERPL-MCNC: 217 MG/DL (ref 100–199)
EOSINOPHIL # BLD AUTO: 0.2 X10E3/UL (ref 0–0.4)
EOSINOPHIL NFR BLD AUTO: 2 %
ERYTHROCYTE [DISTWIDTH] IN BLOOD BY AUTOMATED COUNT: 13.4 % (ref 12.3–15.4)
HCT VFR BLD AUTO: 46.9 % (ref 34–46.6)
HCV AB S/CO SERPL IA: <0.1 S/CO RATIO (ref 0–0.9)
HDLC SERPL-MCNC: 51 MG/DL
HGB BLD-MCNC: 15.4 G/DL (ref 11.1–15.9)
IMM GRANULOCYTES # BLD: 0 X10E3/UL (ref 0–0.1)
IMM GRANULOCYTES NFR BLD: 0 %
INTERPRETATION, 910389: NORMAL
LDLC SERPL CALC-MCNC: 135 MG/DL (ref 0–99)
LYMPHOCYTES # BLD AUTO: 1.8 X10E3/UL (ref 0.7–3.1)
LYMPHOCYTES NFR BLD AUTO: 23 %
MCH RBC QN AUTO: 31.4 PG (ref 26.6–33)
MCHC RBC AUTO-ENTMCNC: 32.8 G/DL (ref 31.5–35.7)
MCV RBC AUTO: 96 FL (ref 79–97)
MONOCYTES # BLD AUTO: 0.5 X10E3/UL (ref 0.1–0.9)
MONOCYTES NFR BLD AUTO: 6 %
NEUTROPHILS # BLD AUTO: 5.2 X10E3/UL (ref 1.4–7)
NEUTROPHILS NFR BLD AUTO: 69 %
PLATELET # BLD AUTO: 295 X10E3/UL (ref 150–379)
RBC # BLD AUTO: 4.9 X10E6/UL (ref 3.77–5.28)
T4 FREE SERPL-MCNC: 1.15 NG/DL (ref 0.82–1.77)
TRIGL SERPL-MCNC: 157 MG/DL (ref 0–149)
TSH SERPL DL<=0.005 MIU/L-ACNC: 2.57 UIU/ML (ref 0.45–4.5)
VLDLC SERPL CALC-MCNC: 31 MG/DL (ref 5–40)
WBC # BLD AUTO: 7.7 X10E3/UL (ref 3.4–10.8)

## 2017-05-10 ENCOUNTER — TELEPHONE (OUTPATIENT)
Dept: ORTHOPEDIC SURGERY | Age: 52
End: 2017-05-10

## 2017-05-10 ENCOUNTER — APPOINTMENT (OUTPATIENT)
Dept: PHYSICAL THERAPY | Age: 52
End: 2017-05-10
Payer: MEDICARE

## 2017-05-10 NOTE — PROGRESS NOTES
Please let patient know that her ten-year CVD risk per ACC/A a risk assessment results are: 6%. This is considered low for her age. No cholesterol lowering medication is warranted at this time. Her thyroid function is within normal limits. There are no significant abnormalities on her CBC. She does not have hepatitis C.     Dr. Hemant Tyson  [facility], [address] [place] [address].  Phone: [phone]  Fax: [phone]
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
## 2017-05-11 ENCOUNTER — TELEPHONE (OUTPATIENT)
Dept: INTERNAL MEDICINE CLINIC | Age: 52
End: 2017-05-11

## 2017-05-11 NOTE — TELEPHONE ENCOUNTER
----- Message from Agatha Najera MD sent at 5/10/2017  4:56 PM EDT -----  Please let patient know that her ten-year CVD risk per ACC/A a risk assessment results are: 6%. This is considered low for her age. No cholesterol lowering medication is warranted at this time. Her thyroid function is within normal limits. There are no significant abnormalities on her CBC. She does not have hepatitis C.     Dr. Stan Garcia  [facility] [address].  Phone: [phone]  Fax: [phone]

## 2017-05-15 ENCOUNTER — HOSPITAL ENCOUNTER (OUTPATIENT)
Dept: PHYSICAL THERAPY | Age: 52
Discharge: HOME | End: 2017-05-15
Payer: MEDICARE

## 2017-05-15 PROCEDURE — G8979 MOBILITY GOAL STATUS: HCPCS

## 2017-05-15 PROCEDURE — G8978 MOBILITY CURRENT STATUS: HCPCS

## 2017-05-15 PROCEDURE — 97162 PT EVAL MOD COMPLEX 30 MIN: CPT

## 2017-05-15 NOTE — PROGRESS NOTES
PT DAILY TREATMENT NOTE - [facility] 3-16    Patient Name: Milind Cooper  Date:5/15/2017  : 1965  [x]  Patient  Verified  Payor: Aftab Brown / Plan: [address] [place] [address] HMO / Product Type: Managed Care Medicare /    In time:2:35  Out time:3:15  Total Treatment Time (min): 40  Total Timed Codes (min): 0  1:1 Treatment Time ([facility] only): 40   Visit #: 1 of 8    Treatment Area: Strain of muscle, fascia and tendon at neck level, subsequent encounter [S16. 1XXD]    SUBJECTIVE  Pain Level (0-10 scale): 7/10  Any medication changes, allergies to medications, adverse drug reactions, diagnosis change, or new procedure performed?: [x] No    [] Yes (see summary sheet for update)  Subjective functional status/changes:   [x] See paper initial evaluation form in patient chart      OBJECTIVE    40 min [x]Eval                  []Re-Eval          With   [x] TE   [] TA   [] neuro   [] other: Patient Education: [x] Review HEP    [] Progressed/Changed HEP based on:   [] positioning   [] body mechanics   [] transfers   [] heat/ice application    [] other:      Other Objective/Functional Measures: FOTO 37 pts     Pain Level (0-10 scale) post treatment: 7/10    ASSESSMENT/Changes in Function:     Patient will continue to benefit from skilled PT services to address functional mobility deficits, address ROM deficits, address strength deficits, analyze and address soft tissue restrictions, analyze and cue movement patterns, analyze and modify body mechanics/ergonomics, assess and modify postural abnormalities, address imbalance/dizziness and instruct in home and community integration to attain remaining goals.      [x]  See Plan of Care         PLAN  []  Upgrade activities as tolerated     [x]  Continue plan of care  []  Update interventions per flow sheet       []  Discharge due to:_  []  Other:_      Kristin Kenyon, PT 5/15/2017  3:18 PM
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
## 2017-05-15 NOTE — TELEPHONE ENCOUNTER
Left message that she needs to have a DEXA scan done prior to getting Kyphoplasty consult per the physician at [place] [address]. Please call me and I will give her the details.

## 2017-05-16 ENCOUNTER — TELEPHONE (OUTPATIENT)
Dept: INTERNAL MEDICINE CLINIC | Age: 52
End: 2017-05-16
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
## 2017-05-16 DIAGNOSIS — S32.000B COMPRESSION FRACTURE OF LUMBAR VERTEBRA, OPEN, INITIAL ENCOUNTER (HCC): ICD-10-CM

## 2017-05-16 NOTE — TELEPHONE ENCOUNTER
Spoke with Zonia Sun, the pt is being referred for a possible kyphoplasty but they do not have any notes, can you fax the last OV note and MRI from 2/14/17 to them at  [phone]

## 2017-05-16 NOTE — TELEPHONE ENCOUNTER
Routed to Charlene Rosario LPN at [facility] to see if they can fax the records to them

## 2017-05-16 NOTE — TELEPHONE ENCOUNTER
Dr Robert Ames at [facility] is who referred her apparently and who ordered the MRI. Rupert Sauer She needs to contact them for records.

## 2017-05-16 NOTE — TELEPHONE ENCOUNTER
Call from Roni Sosa at [facility], she is req to spk to a nurse about pt's recent mri and her upcoming appmnt she is to have with them, Valerie Bautista

## 2017-05-18 ENCOUNTER — TELEPHONE (OUTPATIENT)
Dept: ORTHOPEDIC SURGERY | Age: 52
End: 2017-05-18

## 2017-05-18 DIAGNOSIS — S32.010D COMPRESSION FRACTURE OF L1 LUMBAR VERTEBRA WITH ROUTINE HEALING: Primary | ICD-10-CM

## 2017-05-18 NOTE — TELEPHONE ENCOUNTER
Order and office notes faxed to [address] [place] [address] to have them set up Dexa Scan for [facility] and to notify patient of date. Gulshan Pacheco will authorize with insurance if needed. Patient aware. Also Joni Song With Kam Machado MD Office will submit [facility] referral for the Dexa scan for [facility].   Gio Hurst

## 2017-05-18 NOTE — TELEPHONE ENCOUNTER
Spoke with Jessica Coronel, she said the pt is not able to go to [place] to have the kyphoplasty because the radiologists at [facility] do not accept Sheldon Oliveros.  She is going to see Dr Salome Carson at [facility], she will need an insurance referral to see him, referral placed

## 2017-05-22 NOTE — PROGRESS NOTES
[facility] - [facility], [address] [place] [address]  [phone] fax    Discharge Summary    Patient name: Carmin Cooks Start of Care: 5/15/2017   Referral source: Kj Hodge MD : 1965    Medical Diagnosis: Strain of muscle, fascia and tendon at neck level, subsequent encounter [S16. 1XXD] Onset Date:2/15/17    Treatment Diagnosis: Neck, Back pain   Prior Hospitalization: see medical history Provider#: [id number]   Medications: Verified on Patient summary List   Comorbidities: Depression; Arthritis; Latex allergy; Tobacco use; Scoliosis  Prior Level of Function: Lives in 1-story home with spouse; managing household; amb without A.D    Visits from Start of Care: 1    Missed Visits: 0    Reporting Period : 5/15/17 to 17    Assessment/ Summary of Care: Pt attended initial evaluation and then called three days later requesting to be D/C secondary to not wanting to get back in the [employer] water. \"  However, Pt had not started appointments yet, so uncertain of reason for comment and subsequent D/C. Pt's goals were unable to be addressed. Please refer to plan of care written on 5/15/17 for objective assessment of Pt's presentation.   Thank you for this referral.    RECOMMENDATIONS:  [x]Discontinue therapy: []Patient has reached or is progressing toward set goals      [x]Patient is non-compliant or has abdicated      []Due to lack of appreciable progress towards set goals    Rose Moore, PT 2017 10:00 AM

## 2017-05-25 ENCOUNTER — TELEPHONE (OUTPATIENT)
Dept: ORTHOPEDIC SURGERY | Age: 52
End: 2017-05-25

## 2017-05-25 NOTE — TELEPHONE ENCOUNTER
Spoke with patient today and informed her that per [employer], they stated because her lumbar fracture was a traumatic fracture and not a pathologic compression fracture due to osteoporosis, her [employer] will not pay for the Dexa scan or the Kyphoplasty

## 2017-06-01 ENCOUNTER — OFFICE VISIT (OUTPATIENT)
Dept: INTERNAL MEDICINE CLINIC | Age: 52
End: 2017-06-01

## 2017-06-01 VITALS
DIASTOLIC BLOOD PRESSURE: 104 MMHG | HEIGHT: 67 IN | SYSTOLIC BLOOD PRESSURE: 133 MMHG | WEIGHT: 165 LBS | TEMPERATURE: 97.4 F | HEART RATE: 86 BPM | BODY MASS INDEX: 25.9 KG/M2 | OXYGEN SATURATION: 97 % | RESPIRATION RATE: 13 BRPM

## 2017-06-01 DIAGNOSIS — M54.42 CHRONIC MIDLINE LOW BACK PAIN WITH BILATERAL SCIATICA: ICD-10-CM

## 2017-06-01 DIAGNOSIS — M51.9 LUMBAR DISC DISEASE: ICD-10-CM

## 2017-06-01 DIAGNOSIS — F41.9 ANXIETY AND DEPRESSION: Primary | ICD-10-CM

## 2017-06-01 DIAGNOSIS — F32.A ANXIETY AND DEPRESSION: Primary | ICD-10-CM

## 2017-06-01 DIAGNOSIS — M54.12 CERVICAL RADICULOPATHY: ICD-10-CM

## 2017-06-01 DIAGNOSIS — G89.29 CHRONIC MIDLINE LOW BACK PAIN WITH BILATERAL SCIATICA: ICD-10-CM

## 2017-06-01 DIAGNOSIS — M47.22 OSTEOARTHRITIS OF SPINE WITH RADICULOPATHY, CERVICAL REGION: ICD-10-CM

## 2017-06-01 DIAGNOSIS — M54.41 CHRONIC MIDLINE LOW BACK PAIN WITH BILATERAL SCIATICA: ICD-10-CM

## 2017-06-01 DIAGNOSIS — L30.4 INTERTRIGO: ICD-10-CM

## 2017-06-01 DIAGNOSIS — G89.4 CHRONIC PAIN SYNDROME: ICD-10-CM

## 2017-06-01 DIAGNOSIS — M50.90 CERVICAL DISC DISEASE: ICD-10-CM

## 2017-06-01 RX ORDER — OXYCODONE HYDROCHLORIDE 10 MG/1
10 TABLET ORAL
Qty: 120 TAB | Refills: 0 | Status: CANCELLED | OUTPATIENT
Start: 2017-06-01

## 2017-06-01 RX ORDER — LORAZEPAM 0.5 MG/1
0.5 TABLET ORAL
Qty: 3 TAB | Refills: 0 | Status: SHIPPED | OUTPATIENT
Start: 2017-06-01 | End: 2017-09-28 | Stop reason: ALTCHOICE

## 2017-06-01 RX ORDER — KETOCONAZOLE 20 MG/G
CREAM TOPICAL DAILY
Qty: 60 G | Refills: 3 | Status: SHIPPED | OUTPATIENT
Start: 2017-06-01 | End: 2017-09-28 | Stop reason: ALTCHOICE

## 2017-06-01 RX ORDER — VENLAFAXINE HYDROCHLORIDE 37.5 MG/1
37.5 CAPSULE, EXTENDED RELEASE ORAL DAILY
Qty: 30 CAP | Refills: 6 | Status: SHIPPED | OUTPATIENT
Start: 2017-06-01 | End: 2017-09-28 | Stop reason: CLARIF

## 2017-06-01 NOTE — PATIENT INSTRUCTIONS
Health Maintenance Due   Topic Date Due    COLONOSCOPY  08/11/1981    Pneumococcal 19-64 Medium Risk (1 of 1 - PPSV23) 08/11/1982    BREAST CANCER SCRN MAMMOGRAM  08/11/2013       PLAN:  Key points we discussed today:  1. Call our office if symptoms worsen or if you have any questions    2. You can use clotrimazole cream (over the counter) or ketoconazole cream (prescription strength) for right sided rash    3. Stop taking citalopram. Start Effexor for depression. If your symptoms worsen, stop this medication immediately and notify us. 4. Ativan ordered as needed - for when you get your MRI. 5. MRI of your cervical/lumbar spine areas ordered    6. Referral placed for Pain Management        Dr. Billy Pacheco  [facility] [address] [facility].  Phone: [phone]  Fax: [phone]    Thank you for choosing [facility] for all of your health care needs. Recovering From Depression: Care Instructions  Your Care Instructions  Taking good care of yourself is important as you recover from depression. In time, your symptoms will fade as your treatment takes hold. Do not give up. Instead, focus your energy on getting better. Your mood will improve. It just takes some time. Focus on things that can help you feel better, such as being with friends and family, eating well, and getting enough rest. But take things slowly. Do not do too much too soon. You will begin to feel better gradually. Follow-up care is a key part of your treatment and safety. Be sure to make and go to all appointments, and call your doctor if you are having problems. It's also a good idea to know your test results and keep a list of the medicines you take. How can you care for yourself at home? Be realistic  · If you have a large task to do, break it up into smaller steps you can handle, and just do what you can.   · You may want to put off important decisions until your depression has lifted. If you have plans that will have a major impact on your life, such as marriage, divorce, or a job change, try to wait a bit. Talk it over with friends and loved ones who can help you look at the overall picture first.  · Reaching out to people for help is important. Do not isolate yourself. Let your family and friends help you. Find someone you can trust and confide in, and talk to that person. · Be patient, and be kind to yourself. Remember that depression is not your fault and is not something you can overcome with willpower alone. Treatment is necessary for depression, just like for any other illness. Feeling better takes time, and your mood will improve little by little. Stay active  · Stay busy and get outside. Take a walk, or try some other light exercise. · Talk with your doctor about an exercise program. Exercise can help with mild depression. · Go to a movie or concert. Take part in a Yarsani activity or other social gathering. Go to a ball game. · Ask a friend to have dinner with you. Take care of yourself  · Eat a balanced diet with plenty of fresh fruits and vegetables, whole grains, and lean protein. If you have lost your appetite, eat small snacks rather than large meals. · Avoid drinking alcohol or using illegal drugs. Do not take medicines that have not been prescribed for you. They may interfere with medicines you may be taking for depression, or they may make your depression worse. · Take your medicines exactly as they are prescribed. You may start to feel better within 1 to 3 weeks of taking antidepressant medicine. But it can take as many as 6 to 8 weeks to see more improvement. If you have questions or concerns about your medicines, or if you do not notice any improvement by 3 weeks, talk to your doctor. · If you have any side effects from your medicine, tell your doctor. Antidepressants can make you feel tired, dizzy, or nervous.  Some people have dry mouth, constipation, headaches, sexual problems, or diarrhea. Many of these side effects are mild and will go away on their own after you have been taking the medicine for a few weeks. Some may last longer. Talk to your doctor if side effects are bothering you too much. You might be able to try a different medicine. · Get enough sleep. If you have problems sleeping:  ¨ Go to bed at the same time every night, and get up at the same time every morning. ¨ Keep your bedroom dark and quiet. ¨ Do not exercise after 5:00 p.m. ¨ Avoid drinks with caffeine after 5:00 p.m. · Avoid sleeping pills unless they are prescribed by the doctor treating your depression. Sleeping pills may make you groggy during the day, and they may interact with other medicine you are taking. · If you have any other illnesses, such as diabetes, heart disease, or high blood pressure, make sure to continue with your treatment. Tell your doctor about all of the medicines you take, including those with or without a prescription. · Keep the numbers for these national suicide hotlines: 7-714-061-TALK (6-981.140.7675) and 8-116-DMGXZMA (5-461.223.9784). If you or someone you know talks about suicide or feeling hopeless, get help right away. When should you call for help? Call 911 anytime you think you may need emergency care. For example, call if:  · You feel like hurting yourself or someone else. · Someone you know has depression and is about to attempt or is attempting suicide. Call your doctor now or seek immediate medical care if:  · You hear voices. · Someone you know has depression and:  ¨ Starts to give away his or her possessions. ¨ Uses illegal drugs or drinks alcohol heavily. ¨ Talks or writes about death, including writing suicide notes or talking about guns, knives, or pills. ¨ Starts to spend a lot of time alone. ¨ Acts very aggressively or suddenly appears calm.   Watch closely for changes in your health, and be sure to contact your doctor if:  · You do not get better as expected. Where can you learn more? Go to http://star-sofía.info/. Enter F586 in the search box to learn more about \"Recovering From Depression: Care Instructions. \"  Current as of: July 26, 2016  Content Version: 11.2  © 9828-5351 [employer]. Care instructions adapted under license by my6sense (which disclaims liability or warranty for this information). If you have questions about a medical condition or this instruction, always ask your healthcare professional. [address] any warranty or liability for your use of this information.

## 2017-06-01 NOTE — MR AVS SNAPSHOT
Visit Information Date & Time Provider Department Dept. Phone Encounter #  
 6/1/2017  3:00 PM Francisco Neri MD Internist of [address] [id number] Follow-up Instructions Return in about 6 weeks (around 7/13/2017). Your Appointments 6/15/2017  3:20 PM  
Follow Up with Citlaly Alfonso MD  
[facility]) [address] [facility] [address]  
[phone]  
  
   
 [address] [facility] [address] 7/13/2017  1:30 PM  
Office Visit with Francisco Neri MD  
[facility] of [address] [facility]) Appt Note: ov 6wks jacques [address], [address] [place] [address] [place]  
  
   
 [address], [address] [facility] Upcoming Health Maintenance Date Due COLONOSCOPY 8/11/1981 Pneumococcal 19-64 Medium Risk (1 of 1 - PPSV23) 8/11/1982 BREAST CANCER SCRN MAMMOGRAM 8/11/2013 INFLUENZA AGE 9 TO ADULT 8/1/2017 PAP AKA CERVICAL CYTOLOGY 2/14/2020 DTaP/Tdap/Td series (2 - Td) 8/5/2025 Allergies as of 6/1/2017  Review Complete On: 6/1/2017 By: Katt Nunez Severity Noted Reaction Type Reactions Latex, Natural Rubber Medium 02/14/2017    Rash Latex, Natural Rubber  08/11/2015    Hives Amoxicillin High 08/05/2015    Anaphylaxis Amoxicillin High 02/14/2017    Shortness of Breath, Swelling Aspirin High 02/14/2017    Itching Bactrim Ds [Sulfamethoxazole-trimethoprim] High 02/14/2017    Diarrhea, Nausea and Vomiting Benadryl [Diphenhydramine Hcl] High 02/14/2017    Shortness of Breath, Swelling Codeine High 02/14/2017    Diarrhea, Itching, Nausea and Vomiting Diphenhydramine Hcl High 08/11/2015    Anaphylaxis Levaquin [Levofloxacin] High 02/14/2017    Rash Daivd Paula Diphenhydramine Medium 02/14/2017    Itching Penicillins Medium 01/01/2013    Hives Tylenol [Acetaminophen] Medium 02/14/2017    Itching Aspirin  08/05/2015    Hives Azithromycin  08/11/2015    Other (comments) PATIENT STATED SHE CAN TOLERATE Z-MYESHA AS OF 11/2016 OR 1/2017 WHEN SHE RECEIVED IT FOR URI Bactrim Ds [Sulfamethoxazole-trimethoprim]  08/11/2015    Nausea and Vomiting Codeine  08/05/2015    Nausea and Vomiting Tylenol [Acetaminophen]  08/05/2015    Hives, Itching Sneezing and slight itching, eyes water Current Immunizations  Never Reviewed Name Date Tdap 8/5/2015 11:55 PM  
  
 Not reviewed this visit You Were Diagnosed With   
  
 Codes Comments Anxiety and depression    -  Primary ICD-10-CM: F41.9, F32.9 ICD-9-CM: 300.00, 311 Chronic midline low back pain with bilateral sciatica     ICD-10-CM: M54.41, M54.42, G89.29 ICD-9-CM: 724.2, 724.3, 338.29 Osteoarthritis of spine with radiculopathy, cervical region     ICD-10-CM: M47.22 
ICD-9-CM: 721.0 Cervical disc disease     ICD-10-CM: M50.90 ICD-9-CM: 722.91 Lumbar disc disease     ICD-10-CM: M51.9 ICD-9-CM: 722.93 Cervical radiculopathy     ICD-10-CM: M54.12 
ICD-9-CM: 723.4 Intertrigo     ICD-10-CM: L30.4 ICD-9-CM: 695.89 Chronic pain syndrome     ICD-10-CM: G89.4 ICD-9-CM: 338. 4 Vitals BP Pulse Temp Resp Height(growth percentile) Weight(growth percentile) (!) 133/104 (BP 1 Location: Right arm, BP Patient Position: Sitting) 86 97.4 °F (36.3 °C) (Oral) 13 5' 7\" (1.702 m) 165 lb (74.8 kg) SpO2 BMI OB Status Smoking Status 97% 25.84 kg/m2 Hysterectomy Current Every Day Smoker Vitals History BMI and BSA Data Body Mass Index Body Surface Area  
 25.84 kg/m 2 1.88 m 2 Preferred Pharmacy Pharmacy Name Phone [facility] [address] [place] [address] [phone] Your Updated Medication List  
  
   
This list is accurate as of: 6/1/17  3:47 PM.  Always use your most recent med list.  
  
  
  
  
 albuterol 90 mcg/actuation inhaler Commonly known as:  PROVENTIL HFA, VENTOLIN HFA, PROAIR HFA Take 1 Puff by inhalation every four (4) hours as needed for Wheezing. CENTRUM SILVER ULTRA WOMEN'S Tab tablet Generic drug:  multivit-mineral-iron-lutein Take 1 Tab by mouth daily. cyclobenzaprine 5 mg tablet Commonly known as:  FLEXERIL Take 1 Tab by mouth three (3) times daily as needed for Muscle Spasm(s). EPINEPHrine 0.3 mg/0.3 mL injection Commonly known as:  EPIPEN  
0.3 mL by IntraMUSCular route once as needed for up to 1 dose. * fluticasone 110 mcg/actuation inhaler Commonly known as:  FLOVENT HFA Take 1 Puff by inhalation every twelve (12) hours. * fluticasone 50 mcg/actuation nasal spray Commonly known as:  Flora Earnest 2 Sprays by Both Nostrils route daily. Indications: ALLERGIC RHINITIS  
  
 ketoconazole 2 % topical cream  
Commonly known as:  NIZORAL Apply  to affected area daily. LORazepam 0.5 mg tablet Commonly known as:  ATIVAN Take 1 Tab by mouth every one (1) hour as needed for Anxiety. Max Daily Amount: 12 mg.  
  
 oxyCODONE IR 10 mg Tab immediate release tablet Commonly known as:  Thressa Blu Take 1 Tab by mouth every six (6) hours as needed. Max Daily Amount: 40 mg.  
  
 venlafaxine-SR 37.5 mg capsule Commonly known as:  EFFEXOR-XR Take 1 Cap by mouth daily. * Notice: This list has 2 medication(s) that are the same as other medications prescribed for you. Read the directions carefully, and ask your doctor or other care provider to review them with you. Prescriptions Printed Refills LORazepam (ATIVAN) 0.5 mg tablet 0 Sig: Take 1 Tab by mouth every one (1) hour as needed for Anxiety. Max Daily Amount: 12 mg. Class: Print Route: Oral  
  
Prescriptions Sent to Pharmacy Refills  
 venlafaxine-SR (EFFEXOR-XR) 37.5 mg capsule 6 Sig: Take 1 Cap by mouth daily.   
 Class: Normal  
 Pharmacy: [id number] [place] [facility] Ph #: [phone] Route: Oral  
 ketoconazole (NIZORAL) 2 % topical cream 3 Sig: Apply  to affected area daily. Class: Normal  
 Pharmacy: [id number] [facility] Ph #: [phone] Route: Topical  
  
We Performed the Following REFERRAL TO PAIN MANAGEMENT [PLF092 Custom] Follow-up Instructions Return in about 6 weeks (around 7/13/2017). To-Do List   
 06/01/2017 Imaging:  MRI CERV SPINE W WO CONT   
  
 06/01/2017 Imaging:  MRI LUMB SPINE W WO CONT Referral Information Referral ID Referred By Referred To  
  
 [id number] Sai Corado Not Available Visits Status Start Date End Date 1 New Request 6/1/17 6/1/18 If your referral has a status of pending review or denied, additional information will be sent to support the outcome of this decision. Patient Instructions Health Maintenance Due Topic Date Due  
 COLONOSCOPY  08/11/1981  Pneumococcal 19-64 Medium Risk (1 of 1 - PPSV23) 08/11/1982  BREAST CANCER SCRN MAMMOGRAM  08/11/2013 PLAN: 
Key points we discussed today: 1. Call our office if symptoms worsen or if you have any questions 2. You can use clotrimazole cream (over the counter) or ketoconazole cream (prescription strength) for right sided rash 3. Stop taking citalopram. Start Effexor for depression. If your symptoms worsen, stop this medication immediately and notify us. 4. Ativan ordered as needed - for when you get your MRI. 5. MRI of your cervical/lumbar spine areas ordered 6. Referral placed for Pain Management Dr. Drew Zuniga [facility] of [address] [place] [address] [address] [address] [address] Phone: [phone] Fax: [phone] Thank you for choosing [facility] for all of your health care needs. Recovering From Depression: Care Instructions Your Care Instructions Taking good care of yourself is important as you recover from depression. In time, your symptoms will fade as your treatment takes hold. Do not give up. Instead, focus your energy on getting better. Your mood will improve. It just takes some time. Focus on things that can help you feel better, such as being with friends and family, eating well, and getting enough rest. But take things slowly. Do not do too much too soon. You will begin to feel better gradually. Follow-up care is a key part of your treatment and safety. Be sure to make and go to all appointments, and call your doctor if you are having problems. It's also a good idea to know your test results and keep a list of the medicines you take. How can you care for yourself at home? Be realistic · If you have a large task to do, break it up into smaller steps you can handle, and just do what you can. · You may want to put off important decisions until your depression has lifted. If you have plans that will have a major impact on your life, such as marriage, divorce, or a job change, try to wait a bit. Talk it over with friends and loved ones who can help you look at the overall picture first. 
· Reaching out to people for help is important. Do not isolate yourself. Let your family and friends help you. Find someone you can trust and confide in, and talk to that person. · Be patient, and be kind to yourself. Remember that depression is not your fault and is not something you can overcome with willpower alone. Treatment is necessary for depression, just like for any other illness. Feeling better takes time, and your mood will improve little by little. Stay active · Stay busy and get outside. Take a walk, or try some other light exercise. · Talk with your doctor about an exercise program. Exercise can help with mild depression. · Go to a movie or concert.  Take part in a Yarsanism activity or other social gathering. Go to a TellApart game. · Ask a friend to have dinner with you. Take care of yourself · Eat a balanced diet with plenty of fresh fruits and vegetables, whole grains, and lean protein. If you have lost your appetite, eat small snacks rather than large meals. · Avoid drinking alcohol or using illegal drugs. Do not take medicines that have not been prescribed for you. They may interfere with medicines you may be taking for depression, or they may make your depression worse. · Take your medicines exactly as they are prescribed. You may start to feel better within 1 to 3 weeks of taking antidepressant medicine. But it can take as many as 6 to 8 weeks to see more improvement. If you have questions or concerns about your medicines, or if you do not notice any improvement by 3 weeks, talk to your doctor. · If you have any side effects from your medicine, tell your doctor. Antidepressants can make you feel tired, dizzy, or nervous. Some people have dry mouth, constipation, headaches, sexual problems, or diarrhea. Many of these side effects are mild and will go away on their own after you have been taking the medicine for a few weeks. Some may last longer. Talk to your doctor if side effects are bothering you too much. You might be able to try a different medicine. · Get enough sleep. If you have problems sleeping: ¨ Go to bed at the same time every night, and get up at the same time every morning. ¨ Keep your bedroom dark and quiet. ¨ Do not exercise after 5:00 p.m. ¨ Avoid drinks with caffeine after 5:00 p.m. · Avoid sleeping pills unless they are prescribed by the doctor treating your depression. Sleeping pills may make you groggy during the day, and they may interact with other medicine you are taking. · If you have any other illnesses, such as diabetes, heart disease, or high blood pressure, make sure to continue with your treatment.  Tell your doctor about all of the medicines you take, including those with or without a prescription. · Keep the numbers for these national suicide hotlines: 8-678-608-TALK (5-839.216.2916) and 4-545-ETTBKPW (2-942.583.5964). If you or someone you know talks about suicide or feeling hopeless, get help right away. When should you call for help? Call 911 anytime you think you may need emergency care. For example, call if: 
· You feel like hurting yourself or someone else. · Someone you know has depression and is about to attempt or is attempting suicide. Call your doctor now or seek immediate medical care if: 
· You hear voices. · Someone you know has depression and: 
¨ Starts to give away his or her possessions. ¨ Uses illegal drugs or drinks alcohol heavily. ¨ Talks or writes about death, including writing suicide notes or talking about guns, knives, or pills. ¨ Starts to spend a lot of time alone. ¨ Acts very aggressively or suddenly appears calm. Watch closely for changes in your health, and be sure to contact your doctor if: 
· You do not get better as expected. Where can you learn more? Go to http://star-sofía.info/. Enter P373 in the search box to learn more about \"Recovering From Depression: Care Instructions. \" Current as of: July 26, 2016 Content Version: 11.2 © 5399-8509 [employer]. Care instructions adapted under license by [employer] (which disclaims liability or warranty for this information). If you have questions about a medical condition or this instruction, always ask your healthcare professional. [place] [address] any warranty or liability for your use of this information. Introducing [facility]! [employer] introduces [employer]k patient portal. Now you can access parts of your medical record, email your doctor's office, and request medication refills online.    
 
1. In your internet browser, go to [email or web address] 2. Click on the First Time User? Click Here link in the Sign In box. You will see the New Member Sign Up page. 3. Enter your DigitalChalk Access Code exactly as it appears below. You will not need to use this code after youve completed the sign-up process. If you do not sign up before the expiration date, you must request a new code. · DigitalChalk Access Code: [email or web address] Expires: 8/13/2017  5:05 PM 
 
4. Enter the last four digits of your Social Security Number (xxxx) and Date of Birth (mm/dd/yyyy) as indicated and click Submit. You will be taken to the next sign-up page. 5. Create a Crowdtapt ID. This will be your DigitalChalk login ID and cannot be changed, so think of one that is secure and easy to remember. 6. Create a DigitalChalk password. You can change your password at any time. 7. Enter your Password Reset Question and Answer. This can be used at a later time if you forget your password. 8. Enter your e-mail address. You will receive e-mail notification when new information is available in [address] 9. Click Sign Up. You can now view and download portions of your medical record. 10. Click the Download Summary menu link to download a portable copy of your medical information. If you have questions, please visit the Frequently Asked Questions section of the DigitalChalk website. Remember, DigitalChalk is NOT to be used for urgent needs. For medical emergencies, dial 911. Now available from your iPhone and Android! Please provide this summary of care documentation to your next provider. Your primary care clinician is listed as Leeroy De La Vega. If you have any questions after today's visit, please call [phone].

## 2017-06-01 NOTE — PROGRESS NOTES
Health Maintenance Due   Topic Date Due    COLONOSCOPY  08/11/1981    Pneumococcal 19-64 Medium Risk (1 of 1 - PPSV23) 08/11/1982    BREAST CANCER SCRN MAMMOGRAM  08/11/2013     Chief Complaint   Patient presents with    Depression     Follow up      1. Have you been to the ER, urgent care clinic or hospitalized since your last visit? NO.     2. Have you seen or consulted any other health care providers outside of the [address] [place] [address] since your last visit (Include any pap smears or colon screening)? NO      Do you have an Advanced Directive? NO    Would you like information on Advanced Directives?  NO

## 2017-06-02 ENCOUNTER — TELEPHONE (OUTPATIENT)
Dept: INTERNAL MEDICINE CLINIC | Age: 52
End: 2017-06-02

## 2017-06-02 NOTE — TELEPHONE ENCOUNTER
Tried calling back was disconnected the first try the second try I was on hold for 20 minutes will try later

## 2017-06-02 NOTE — TELEPHONE ENCOUNTER
Call from Tatyana at Pain Management, she needs to spk to nurse about pt's office notes and her no shows, Yobany Myers

## 2017-06-04 PROBLEM — R26.9 GAIT ABNORMALITY: Status: RESOLVED | Noted: 2017-05-03 | Resolved: 2017-06-04

## 2017-06-04 PROBLEM — R29.898 WEAKNESS OF BOTH ARMS: Status: RESOLVED | Noted: 2017-03-28 | Resolved: 2017-06-04

## 2017-06-04 PROBLEM — R26.89 FUNCTIONAL GAIT ABNORMALITY: Status: RESOLVED | Noted: 2017-03-28 | Resolved: 2017-06-04
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
## 2017-06-05 NOTE — TELEPHONE ENCOUNTER
Spoke with richard notes faxed to there office they will review and call back if we need to do an insurance referral

## 2017-07-12 ENCOUNTER — TELEPHONE (OUTPATIENT)
Dept: INTERNAL MEDICINE CLINIC | Age: 52
End: 2017-07-12

## 2017-07-12 NOTE — TELEPHONE ENCOUNTER
Patient notified and stated that she would call pain management to clear up issues of missed appointments

## 2017-07-12 NOTE — TELEPHONE ENCOUNTER
Patient stating she has not been contacted to be scheduled for pain management. Stating she is tired of waiting and wants this appt ASAP.

## 2017-09-28 ENCOUNTER — OFFICE VISIT (OUTPATIENT)
Dept: INTERNAL MEDICINE CLINIC | Age: 52
End: 2017-09-28

## 2017-09-28 VITALS
HEIGHT: 68 IN | BODY MASS INDEX: 24.55 KG/M2 | HEART RATE: 82 BPM | SYSTOLIC BLOOD PRESSURE: 128 MMHG | WEIGHT: 162 LBS | RESPIRATION RATE: 16 BRPM | TEMPERATURE: 99.3 F | DIASTOLIC BLOOD PRESSURE: 92 MMHG | OXYGEN SATURATION: 98 %

## 2017-09-28 DIAGNOSIS — M54.12 CERVICAL RADICULOPATHY: ICD-10-CM

## 2017-09-28 DIAGNOSIS — F32.A ANXIETY AND DEPRESSION: ICD-10-CM

## 2017-09-28 DIAGNOSIS — F41.9 ANXIETY AND DEPRESSION: ICD-10-CM

## 2017-09-28 DIAGNOSIS — F17.210 NICOTINE DEPENDENCE, CIGARETTES, UNCOMPLICATED: ICD-10-CM

## 2017-09-28 DIAGNOSIS — J06.9 ACUTE URI: Primary | ICD-10-CM

## 2017-09-28 DIAGNOSIS — G89.4 CHRONIC PAIN SYNDROME: ICD-10-CM

## 2017-09-28 RX ORDER — AZITHROMYCIN 250 MG/1
TABLET, FILM COATED ORAL
Qty: 6 TAB | Refills: 0 | Status: SHIPPED | OUTPATIENT
Start: 2017-09-28 | End: 2017-11-21

## 2017-09-28 RX ORDER — VENLAFAXINE HYDROCHLORIDE 75 MG/1
75 CAPSULE, EXTENDED RELEASE ORAL DAILY
Qty: 30 CAP | Refills: 3 | Status: SHIPPED | OUTPATIENT
Start: 2017-09-28 | End: 2018-02-07 | Stop reason: SDUPTHER

## 2017-09-28 NOTE — PROGRESS NOTES
1. Have you been to the ER, urgent care clinic since your last visit? Hospitalized since your last visit? No    2. Have you seen or consulted any other health care providers outside of the [address] [place] [address] since your last visit? Include any pap smears or colon screening.  No
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
## 2017-09-28 NOTE — MR AVS SNAPSHOT
Visit Information Date & Time Provider Department Dept. Phone Encounter #  
 9/28/2017 12:45 PM Sybil Wyatt NP [facility] INTERNAL MEDICINE OF [address] [id number] Upcoming Health Maintenance Date Due COLONOSCOPY 8/11/1981 Pneumococcal 19-64 Medium Risk (1 of 1 - PPSV23) 8/11/1982 BREAST CANCER SCRN MAMMOGRAM 8/11/2013 INFLUENZA AGE 9 TO ADULT 8/1/2017 PAP AKA CERVICAL CYTOLOGY 2/14/2020 DTaP/Tdap/Td series (2 - Td) 8/5/2025 Allergies as of 9/28/2017  Review Complete On: 9/28/2017 By: Sybil Wyatt NP Severity Noted Reaction Type Reactions Latex, Natural Rubber Medium 02/14/2017    Rash Latex, Natural Rubber  08/11/2015    Hives Amoxicillin High 08/05/2015    Anaphylaxis Amoxicillin High 02/14/2017    Shortness of Breath, Swelling Aspirin High 02/14/2017    Itching Bactrim Ds [Sulfamethoxazole-trimethoprim] High 02/14/2017    Diarrhea, Nausea and Vomiting Benadryl [Diphenhydramine Hcl] High 02/14/2017    Shortness of Breath, Swelling Codeine High 02/14/2017    Diarrhea, Itching, Nausea and Vomiting Diphenhydramine Hcl High 08/11/2015    Anaphylaxis Levaquin [Levofloxacin] High 02/14/2017    Rash Mac Harjit Diphenhydramine Medium 02/14/2017    Itching Penicillins Medium 01/01/2013    Hives Tylenol [Acetaminophen] Medium 02/14/2017    Itching Aspirin  08/05/2015    Hives Azithromycin  08/11/2015    Other (comments) PATIENT STATED SHE CAN TOLERATE Z-MYESHA AS OF 11/2016 OR 1/2017 WHEN SHE RECEIVED IT FOR URI Bactrim Ds [Sulfamethoxazole-trimethoprim]  08/11/2015    Nausea and Vomiting Codeine  08/05/2015    Nausea and Vomiting Tylenol [Acetaminophen]  08/05/2015    Hives, Itching Sneezing and slight itching, eyes water Current Immunizations  Never Reviewed Name Date Tdap 8/5/2015 11:55 PM  
  
 Not reviewed this visit You Were Diagnosed With   
  
 Codes Comments Acute URI    -  Primary ICD-10-CM: J06.9 ICD-9-CM: 465.9 Chronic pain syndrome     ICD-10-CM: G89.4 ICD-9-CM: 338.4 Nicotine dependence, cigarettes, uncomplicated     UOA-84-TQ: F17.210 ICD-9-CM: 305.1 Anxiety and depression     ICD-10-CM: F41.9, F32.9 ICD-9-CM: 300.00, 311 Cervical radiculopathy     ICD-10-CM: M54.12 
ICD-9-CM: 723.4 Vitals BP Pulse Temp Resp Height(growth percentile) (!) 128/92 (BP 1 Location: Left arm, BP Patient Position: Sitting) 82 99.3 °F (37.4 °C) (Tympanic) 16 5' 8\" (1.727 m) Weight(growth percentile) SpO2 BMI OB Status Smoking Status 162 lb (73.5 kg) 98% 24.63 kg/m2 Hysterectomy Current Every Day Smoker BMI and BSA Data Body Mass Index Body Surface Area  
 24.63 kg/m 2 1.88 m 2 Preferred Pharmacy Pharmacy Name [facility] [address] [place] [address] [phone] Your Updated Medication List  
  
   
This list is accurate as of: 9/28/17  1:55 PM.  Always use your most recent med list.  
  
  
  
  
 albuterol 90 mcg/actuation inhaler Commonly known as:  PROVENTIL HFA, VENTOLIN HFA, PROAIR HFA Take 1 Puff by inhalation every four (4) hours as needed for Wheezing. azithromycin 250 mg tablet Commonly known as:  Bimal Dalton Take 2 tablets today, then take 1 tablet daily  
  
 cyclobenzaprine 5 mg tablet Commonly known as:  FLEXERIL Take 1 Tab by mouth three (3) times daily as needed for Muscle Spasm(s). EPINEPHrine 0.3 mg/0.3 mL injection Commonly known as:  EPIPEN  
0.3 mL by IntraMUSCular route once as needed for up to 1 dose. * fluticasone 110 mcg/actuation inhaler Commonly known as:  FLOVENT HFA Take 1 Puff by inhalation every twelve (12) hours. * fluticasone 50 mcg/actuation nasal spray Commonly known as:  Madina Gallvignesh 2 Sprays by Both Nostrils route daily. Indications: ALLERGIC RHINITIS  
  
 venlafaxine-SR 75 mg capsule Commonly known as:  EFFEXOR-XR Take 1 Cap by mouth daily. * Notice: This list has 2 medication(s) that are the same as other medications prescribed for you. Read the directions carefully, and ask your doctor or other care provider to review them with you. Prescriptions Sent to Pharmacy Refills  
 venlafaxine-SR (EFFEXOR-XR) 75 mg capsule 3 Sig: Take 1 Cap by mouth daily. Class: Normal  
 Pharmacy: [address] Ph #: [phone] Route: Oral  
 azithromycin (ZITHROMAX) 250 mg tablet 0 Sig: Take 2 tablets today, then take 1 tablet daily Class: Normal  
 Pharmacy: [address] Ph #: [phone] We Performed the Following REFERRAL TO ORTHOPEDICS [GWM913 Custom] Comments:  
 Cervical neck pain REFERRAL TO PAIN MANAGEMENT [HQG345 Custom] Referral Information Referral ID Referred By Referred To  
  
 [id number] [address] Phone: [phone] Fax: [phone] Visits Status Start Date End Date 1 New Request 9/28/17 9/28/18 If your referral has a status of pending review or denied, additional information will be sent to support the outcome of this decision. Referral ID Referred By Referred To  
 [id number] Dalia BENNETT Not Available Visits Status Start Date End Date 1 New Request 9/28/17 9/28/18 If your referral has a status of pending review or denied, additional information will be sent to support the outcome of this decision. Patient Instructions Health Maintenance Due Topic Date Due  
 Colonoscopy  08/11/1981  Pneumococcal Vaccine (1 of 1 - PPSV23) 08/11/1982  Breast Cancer Screening  08/11/2013  Flu Vaccine  08/01/2017 Introducing [facility] SERVICES! Julieta Rodriguez introduces [employer] patient portal. Now you can access parts of your medical record, email your doctor's office, and request medication refills online. 1. In your internet browser, go to [email or web address] 2. Click on the First Time User? Click Here link in the Sign In box. You will see the New Member Sign Up page. 3. Enter your [employer] Access Code exactly as it appears below. You will not need to use this code after youve completed the sign-up process. If you do not sign up before the expiration date, you must request a new code. · [employer] Access Code: [email or web address] Expires: 12/27/2017  1:55 PM 
 
4. Enter the last four digits of your Social Security Number (xxxx) and Date of Birth (mm/dd/yyyy) as indicated and click Submit. You will be taken to the next sign-up page. 5. Create a [employer] ID. This will be your [employer] login ID and cannot be changed, so think of one that is secure and easy to remember. 6. Create a [employer] password. You can change your password at any time. 7. Enter your Password Reset Question and Answer. This can be used at a later time if you forget your password. 8. Enter your e-mail address. You will receive e-mail notification when new information is available in [address] 9. Click Sign Up. You can now view and download portions of your medical record. 10. Click the Download Summary menu link to download a portable copy of your medical information. If you have questions, please visit the Frequently Asked Questions section of the [employer] website. Remember, [employer] is NOT to be used for urgent needs. For medical emergencies, dial 911. Now available from your iPhone and Android! Please provide this summary of care documentation to your next provider. Your primary care clinician is listed as Jarocho Baxter. If you have any questions after today's visit, please call [phone].

## 2017-09-28 NOTE — PATIENT INSTRUCTIONS
Health Maintenance Due   Topic Date Due    Colonoscopy  08/11/1981    Pneumococcal Vaccine (1 of 1 - PPSV23) 08/11/1982    Breast Cancer Screening  08/11/2013    Flu Vaccine  08/01/2017

## 2017-10-05 ENCOUNTER — TELEPHONE (OUTPATIENT)
Dept: INTERNAL MEDICINE CLINIC | Age: 52
End: 2017-10-05

## 2017-11-21 ENCOUNTER — OFFICE VISIT (OUTPATIENT)
Dept: INTERNAL MEDICINE CLINIC | Age: 52
End: 2017-11-21

## 2017-11-21 ENCOUNTER — HOSPITAL ENCOUNTER (OUTPATIENT)
Dept: LAB | Age: 52
Discharge: HOME | End: 2017-11-21
Payer: MEDICARE

## 2017-11-21 VITALS
WEIGHT: 166 LBS | HEART RATE: 74 BPM | DIASTOLIC BLOOD PRESSURE: 88 MMHG | RESPIRATION RATE: 18 BRPM | TEMPERATURE: 98.1 F | OXYGEN SATURATION: 98 % | SYSTOLIC BLOOD PRESSURE: 138 MMHG | HEIGHT: 68 IN | BODY MASS INDEX: 25.16 KG/M2

## 2017-11-21 DIAGNOSIS — Z12.39 BREAST CANCER SCREENING: ICD-10-CM

## 2017-11-21 DIAGNOSIS — Z23 ENCOUNTER FOR IMMUNIZATION: ICD-10-CM

## 2017-11-21 DIAGNOSIS — M54.12 CERVICAL RADICULOPATHY: Primary | ICD-10-CM

## 2017-11-21 DIAGNOSIS — R23.3 ABNORMAL BRUISING: ICD-10-CM

## 2017-11-21 DIAGNOSIS — M51.36 LUMBAR DEGENERATIVE DISC DISEASE: ICD-10-CM

## 2017-11-21 LAB
BASOPHILS # BLD: 0 K/UL (ref 0–0.06)
BASOPHILS NFR BLD: 1 % (ref 0–2)
DIFFERENTIAL METHOD BLD: ABNORMAL
EOSINOPHIL # BLD: 0.2 K/UL (ref 0–0.4)
EOSINOPHIL NFR BLD: 3 % (ref 0–5)
ERYTHROCYTE [DISTWIDTH] IN BLOOD BY AUTOMATED COUNT: 13 % (ref 11.6–14.5)
HCT VFR BLD AUTO: 48.4 % (ref 35–45)
HGB BLD-MCNC: 15.9 G/DL (ref 12–16)
LYMPHOCYTES # BLD: 2.1 K/UL (ref 0.9–3.6)
LYMPHOCYTES NFR BLD: 31 % (ref 21–52)
MCH RBC QN AUTO: 32.5 PG (ref 24–34)
MCHC RBC AUTO-ENTMCNC: 32.9 G/DL (ref 31–37)
MCV RBC AUTO: 99 FL (ref 74–97)
MONOCYTES # BLD: 0.5 K/UL (ref 0.05–1.2)
MONOCYTES NFR BLD: 7 % (ref 3–10)
NEUTS SEG # BLD: 3.9 K/UL (ref 1.8–8)
NEUTS SEG NFR BLD: 58 % (ref 40–73)
PLATELET # BLD AUTO: 270 K/UL (ref 135–420)
PMV BLD AUTO: 11.3 FL (ref 9.2–11.8)
RBC # BLD AUTO: 4.89 M/UL (ref 4.2–5.3)
WBC # BLD AUTO: 6.7 K/UL (ref 4.6–13.2)

## 2017-11-21 PROCEDURE — 85025 COMPLETE CBC W/AUTO DIFF WBC: CPT | Performed by: NURSE PRACTITIONER

## 2017-11-21 RX ORDER — GABAPENTIN 100 MG/1
200 CAPSULE ORAL
Qty: 60 CAP | Refills: 1 | Status: SHIPPED | OUTPATIENT
Start: 2017-11-21 | End: 2017-11-21

## 2017-11-21 RX ORDER — GABAPENTIN 100 MG/1
200 CAPSULE ORAL
Qty: 60 CAP | Refills: 2 | Status: SHIPPED | OUTPATIENT
Start: 2017-11-21 | End: 2018-05-31

## 2017-11-21 NOTE — PROGRESS NOTES
Chata Marlow is a 45 y.o. female presenting today for Well Woman (2 month follow up)  . HPI:  Chata Marlow presents to the office today for well woman follow-up. Patient noted she continues to have cervical neck pain with left upper extremity radiculopathy and back pain. Patient noted she has not being able to get her MRI done or see the Orthopedic/Spine specialist due  medical concerns with her son. She notes she will contact Spine Specialist today. She denies a chest pain or palpitations. Patient notes her depression has improved. Review of Systems   Cardiovascular: Negative for chest pain and palpitations. Musculoskeletal: Positive for back pain and neck pain. Neurological: Negative for dizziness and headaches. Psychiatric/Behavioral: Positive for depression. Allergies   Allergen Reactions    Latex, Natural Rubber Rash    Latex, Natural Rubber Hives    Amoxicillin Anaphylaxis    Amoxicillin Shortness of Breath and Swelling    Aspirin Itching    Bactrim Ds [Sulfamethoxazole-Trimethoprim] Diarrhea and Nausea and Vomiting    Benadryl [Diphenhydramine Hcl] Shortness of Breath and Swelling    Codeine Diarrhea, Itching and Nausea and Vomiting    Diphenhydramine Hcl Anaphylaxis    Levaquin [Levofloxacin] Rash     Thrush    Diphenhydramine Itching    Penicillins Hives    Tylenol [Acetaminophen] Itching    Aspirin Hives    Azithromycin Other (comments)     PATIENT STATED SHE CAN TOLERATE Z-MYESHA AS OF 11/2016 OR 1/2017 WHEN SHE RECEIVED IT FOR URI    Bactrim Ds [Sulfamethoxazole-Trimethoprim] Nausea and Vomiting    Codeine Nausea and Vomiting    Tylenol [Acetaminophen] Hives and Itching     Sneezing and slight itching, eyes water       Current Outpatient Prescriptions   Medication Sig Dispense Refill    gabapentin (NEURONTIN) 100 mg capsule Take 2 Caps by mouth nightly. 60 Cap 1    venlafaxine-SR (EFFEXOR-XR) 75 mg capsule Take 1 Cap by mouth daily.  30 Cap 3    albuterol (PROVENTIL HFA, VENTOLIN HFA, PROAIR HFA) 90 mcg/actuation inhaler Take 1 Puff by inhalation every four (4) hours as needed for Wheezing. 1 Inhaler 11    fluticasone (FLOVENT HFA) 110 mcg/actuation inhaler Take 1 Puff by inhalation every twelve (12) hours. 1 Inhaler 11    fluticasone (FLONASE) 50 mcg/actuation nasal spray 2 Sprays by Both Nostrils route daily. Indications: ALLERGIC RHINITIS 1 Bottle 11    cyclobenzaprine (FLEXERIL) 5 mg tablet Take 1 Tab by mouth three (3) times daily as needed for Muscle Spasm(s). 30 Tab 3    EPINEPHrine (EPIPEN) 0.3 mg/0.3 mL injection 0.3 mL by IntraMUSCular route once as needed for up to 1 dose. 1 Syringe 11       Past Medical History:   Diagnosis Date    Arthritis     Asthma     Cancer [facility] [address] 2006    cervical    Cervical cancer ([facility] [address] 2006    Chronic obstructive pulmonary disease (HCC)     Chronic pain     Depression     H/O cervical spine surgery 2013    C3-7    Headache     Ill-defined condition     cervical cancer    Psychotic disorder     Anxiety and Panic Disorders    Right thyroid nodule 3/26/2017       Past Surgical History:   Procedure Laterality Date    HX HYSTERECTOMY      HX OTHER SURGICAL      C3-7 SX 8 years ago    HX TONSILLECTOMY      HX WISDOM TEETH EXTRACTION      x4       Social History     Social History    Marital status:      Spouse name: N/A    Number of children: N/A    Years of education: N/A     Occupational History    Not on file.      Social History Main Topics    Smoking status: Current Every Day Smoker     Packs/day: 1.00     Years: 31.00    Smokeless tobacco: Never Used    Alcohol use 0.0 oz/week     0 Standard drinks or equivalent per week      Comment: social    Drug use: No    Sexual activity: Yes     Other Topics Concern    Not on file     Social History Narrative    ** Merged History Encounter **            Patient does not have an advanced directive on file    Vitals:    11/21/17 1042   BP: 138/88   Pulse: 74 Resp: 18   Temp: 98.1 °F (36.7 °C)   TempSrc: Tympanic   SpO2: 98%   Weight: 166 lb (75.3 kg)   Height: 5' 8\" (1.727 m)   PainSc:   7   PainLoc: Neck       Physical Exam   Constitutional: No distress. Cardiovascular: Normal rate, regular rhythm and normal heart sounds. No murmur heard. Pulmonary/Chest: Effort normal and breath sounds normal.   Abdominal: Soft. Musculoskeletal: She exhibits no edema. Lymphadenopathy:     She has no cervical adenopathy. Neurological: She is alert. Psychiatric: Affect normal.       No visits with results within 3 Month(s) from this visit. Latest known visit with results is:    Office Visit on 04/19/2017   Component Date Value Ref Range Status    TSH 05/08/2017 2.570  0.450 - 4.500 uIU/mL Final    T4, Free 05/08/2017 1.15  0.82 - 1.77 ng/dL Final    WBC 05/08/2017 7.7  3.4 - 10.8 x10E3/uL Final    RBC 05/08/2017 4.90  3.77 - 5.28 x10E6/uL Final    HGB 05/08/2017 15.4  11.1 - 15.9 g/dL Final    HCT 05/08/2017 46.9* 34.0 - 46.6 % Final    MCV 05/08/2017 96  79 - 97 fL Final    MCH 05/08/2017 31.4  26.6 - 33.0 pg Final    MCHC 05/08/2017 32.8  31.5 - 35.7 g/dL Final    RDW 05/08/2017 13.4  12.3 - 15.4 % Final    PLATELET 14/10/7030 468  150 - 379 x10E3/uL Final    NEUTROPHILS 05/08/2017 69  % Final    Lymphocytes 05/08/2017 23  % Final    MONOCYTES 05/08/2017 6  % Final    EOSINOPHILS 05/08/2017 2  % Final    BASOPHILS 05/08/2017 0  % Final    ABS. NEUTROPHILS 05/08/2017 5.2  1.4 - 7.0 x10E3/uL Final    Abs Lymphocytes 05/08/2017 1.8  0.7 - 3.1 x10E3/uL Final    ABS. MONOCYTES 05/08/2017 0.5  0.1 - 0.9 x10E3/uL Final    ABS. EOSINOPHILS 05/08/2017 0.2  0.0 - 0.4 x10E3/uL Final    ABS. BASOPHILS 05/08/2017 0.0  0.0 - 0.2 x10E3/uL Final    IMMATURE GRANULOCYTES 05/08/2017 0  % Final    ABS. IMM.  GRANS. 05/08/2017 0.0  0.0 - 0.1 x10E3/uL Final    Cholesterol, total 05/08/2017 217* 100 - 199 mg/dL Final    Triglyceride 05/08/2017 157* 0 - 149 mg/dL Final    HDL Cholesterol 05/08/2017 51  >39 mg/dL Final    VLDL, calculated 05/08/2017 31  5 - 40 mg/dL Final    LDL, calculated 05/08/2017 135* 0 - 99 mg/dL Final    Hep C Virus Ab 05/08/2017 <0.1  0.0 - 0.9 s/co ratio Final    Comment:                                   Negative:     < 0.8                               Indeterminate: 0.8 - 0.9                                    Positive:     > 0.9   The CDC recommends that a positive HCV antibody result   be followed up with a HCV Nucleic Acid Amplification   test ([id number]).  INTERPRETATION 05/08/2017 Note   Final    Supplement report is available. .No results found for any visits on 11/21/17. Assessment / Plan:      ICD-10-CM ICD-9-CM    1. Cervical radiculopathy M54.12 723.4 gabapentin (NEURONTIN) 100 mg capsule   2. Abnormal bruising R23.8 782.9 CBC WITH AUTOMATED DIFF   3. Encounter for immunization Z23 V03.89 INFLUENZA VIRUS VAC QUAD,SPLIT,PRESV FREE SYRINGE IM      ADMIN INFLUENZA VIRUS VAC   4. Lumbar degenerative disc disease M51.36 722.52      Gabapentin rx given  CBC today  Influenza rx  Cervical and Lumbar pain- follow-up with Specialist  Patient has a Family history of Colon Ca- patient refused ordering exam today. Will order in 4 months  Elevated B/p- Lifestyle Modifications      Follow-up Disposition:  Return if symptoms worsen or fail to improve. I asked the patient if she  had any questions and answered her  questions.   The patient stated that she understands the treatment plan and agrees with the treatment plan

## 2017-11-21 NOTE — PATIENT INSTRUCTIONS
Vaccine Information Statement    Influenza (Flu) Vaccine (Inactivated or Recombinant): What you need to know    Many Vaccine Information Statements are available in Portuguese and other languages. See www.immunize.org/vis  Hojas de Información Sobre Vacunas están disponibles en Español y en muchos otros idiomas. Visite www.immunize.org/vis    1. Why get vaccinated? Influenza (flu) is a contagious disease that spreads around the [place] every year, usually between October and May. Flu is caused by influenza viruses, and is spread mainly by coughing, sneezing, and close contact. Anyone can get flu. Flu strikes suddenly and can last several days. Symptoms vary by age, but can include:   fever/chills   sore throat   muscle aches   fatigue   cough   headache    runny or stuffy nose    Flu can also lead to pneumonia and blood infections, and cause diarrhea and seizures in children. If you have a medical condition, such as heart or lung disease, flu can make it worse. Flu is more dangerous for some people. Infants and young children, people 72years of age and older, pregnant women, and people with certain health conditions or a weakened immune system are at greatest risk. Each year thousands of people in the [facility] die from flu, and many more are hospitalized. Flu vaccine can:   keep you from getting flu,   make flu less severe if you do get it, and   keep you from spreading flu to your family and other people. 2. Inactivated and recombinant flu vaccines    A dose of flu vaccine is recommended every flu season. Children 6 months through 6years of age may need two doses during the same flu season. Everyone else needs only one dose each flu season.        Some inactivated flu vaccines contain a very small amount of a mercury-based preservative called thimerosal. Studies have not shown thimerosal in vaccines to be harmful, but flu vaccines that do not contain thimerosal are available. There is no live flu virus in flu shots. They cannot cause the flu. There are many flu viruses, and they are always changing. Each year a new flu vaccine is made to protect against three or four viruses that are likely to cause disease in the upcoming flu season. But even when the vaccine doesnt exactly match these viruses, it may still provide some protection    Flu vaccine cannot prevent:   flu that is caused by a virus not covered by the vaccine, or   illnesses that look like flu but are not. It takes about 2 weeks for protection to develop after vaccination, and protection lasts through the flu season. 3. Some people should not get this vaccine    Tell the person who is giving you the vaccine:     If you have any severe, life-threatening allergies. If you ever had a life-threatening allergic reaction after a dose of flu vaccine, or have a severe allergy to any part of this vaccine, you may be advised not to get vaccinated. Most, but not all, types of flu vaccine contain a small amount of egg protein.  If you ever had Guillain-Barré Syndrome (also called GBS). Some people with a history of GBS should not get this vaccine. This should be discussed with your doctor.  If you are not feeling well. It is usually okay to get flu vaccine when you have a mild illness, but you might be asked to come back when you feel better. 4. Risks of a vaccine reaction    With any medicine, including vaccines, there is a chance of reactions. These are usually mild and go away on their own, but serious reactions are also possible. Most people who get a flu shot do not have any problems with it.      Minor problems following a flu shot include:    soreness, redness, or swelling where the shot was given     hoarseness   sore, red or itchy eyes   cough   fever   aches   headache   itching   fatigue  If these problems occur, they usually begin soon after the shot and last 1 or 2 days. More serious problems following a flu shot can include the following:     There may be a small increased risk of Guillain-Barré Syndrome (GBS) after inactivated flu vaccine. This risk has been estimated at 1 or 2 additional cases per million people vaccinated. This is much lower than the risk of severe complications from flu, which can be prevented by flu vaccine.  Young children who get the flu shot along with pneumococcal vaccine (PCV13) and/or DTaP vaccine at the same time might be slightly more likely to have a seizure caused by fever. Ask your doctor for more information. Tell your doctor if a child who is getting flu vaccine has ever had a seizure. Problems that could happen after any injected vaccine:      People sometimes faint after a medical procedure, including vaccination. Sitting or lying down for about 15 minutes can help prevent fainting, and injuries caused by a fall. Tell your doctor if you feel dizzy, or have vision changes or ringing in the ears.  Some people get severe pain in the shoulder and have difficulty moving the arm where a shot was given. This happens very rarely.  Any medication can cause a severe allergic reaction. Such reactions from a vaccine are very rare, estimated at about 1 in a million doses, and would happen within a few minutes to a few hours after the vaccination. As with any medicine, there is a very remote chance of a vaccine causing a serious injury or death. The safety of vaccines is always being monitored. For more information, visit: www.cdc.gov/vaccinesafety/    5. What if there is a serious reaction? What should I look for?  Look for anything that concerns you, such as signs of a severe allergic reaction, very high fever, or unusual behavior.     Signs of a severe allergic reaction can include hives, swelling of the face and throat, difficulty breathing, a fast heartbeat, dizziness, and weakness - usually within a few minutes to a few hours after the vaccination. What should I do?  If you think it is a severe allergic reaction or other emergency that cant wait, call 9-1-1 and get the person to the nearest hospital. Otherwise, call your doctor.  Reactions should be reported to the Vaccine Adverse Event Reporting System (VAERS). Your doctor should file this report, or you can do it yourself through  the VAERS web site at www.vaers. [facility].gov, or by calling 0-871.288.7705. VAERS does not give medical advice. 6. The National Vaccine Injury Compensation Program    The [facility] Vaccine Injury Compensation Program (VICP) is a federal program that was created to compensate people who may have been injured by certain vaccines. Persons who believe they may have been injured by a vaccine can learn about the program and about filing a claim by calling 2-612.123.5163 or visiting the [employer] website at www.[facility].gov/vaccinecompensation. There is a time limit to file a claim for compensation. 7. How can I learn more?  Ask your healthcare provider. He or she can give you the vaccine package insert or suggest other sources of information.  Call your local or state health department.  Contact the Centers for Disease Control and Prevention (CDC):  - Call 7-792.523.7467 (1-800-CDC-INFO) or  - Visit CDCs website at www.cdc.gov/flu    Vaccine Information Statement   Inactivated Influenza Vaccine   8/7/2015  [address] Jamilwilliam Azar 661CC-03    Department of Health and Human Services  Centers for Disease Control and Prevention    Office Use Only

## 2017-11-21 NOTE — PROGRESS NOTES
Patient presents for   Chief Complaint   Patient presents with    Well Woman     2 month follow up     Fall risk assessment was not indicated. Depression screening was not indicated Follow up questions were not indicated. 1. Have you been to the ER, urgent care clinic since your last visit? Hospitalized since your last visit? No    2. Have you seen or consulted any other health care providers outside of the [address] [place] [address] since your last visit? Include any pap smears or colon screening. Dorie Jurado is a 45 y.o. female who presents for routine immunizations. She denies any symptoms , reactions or allergies that would exclude them from being immunized today. Risks and adverse reactions were discussed and the VIS was given to them. All questions were addressed. She was observed for 15 min post injection. There were no reactions observed.     Valerio Alcala LPN

## 2017-11-21 NOTE — MR AVS SNAPSHOT
Visit Information Date & Time Provider Department Dept. Phone Encounter #  
 11/21/2017  1:00 PM Pollo Landon NP [facility] INTERNAL MEDICINE OF [address] [id number] Your Appointments 11/21/2017  1:00 PM  
Follow Up with Pollo Landon NP  
[facility] INTERNAL MEDICINE OF [place] ([address]) Appt Note: 2mo  
 [address], [address]  
  
   
 [address], [address] [facility] [address] Upcoming Health Maintenance Date Due COLONOSCOPY 8/11/1981 BREAST CANCER SCRN MAMMOGRAM 8/11/2013 Influenza Age 5 to Adult 8/1/2017 PAP AKA CERVICAL CYTOLOGY 2/14/2020 DTaP/Tdap/Td series (2 - Td) 8/5/2025 Allergies as of 11/21/2017  Review Complete On: 11/21/2017 By: Pollo Landon NP Severity Noted Reaction Type Reactions Latex, Natural Rubber Medium 02/14/2017    Rash Latex, Natural Rubber  08/11/2015    Hives Amoxicillin High 08/05/2015    Anaphylaxis Amoxicillin High 02/14/2017    Shortness of Breath, Swelling Aspirin High 02/14/2017    Itching Bactrim Ds [Sulfamethoxazole-trimethoprim] High 02/14/2017    Diarrhea, Nausea and Vomiting Benadryl [Diphenhydramine Hcl] High 02/14/2017    Shortness of Breath, Swelling Codeine High 02/14/2017    Diarrhea, Itching, Nausea and Vomiting Diphenhydramine Hcl High 08/11/2015    Anaphylaxis Levaquin [Levofloxacin] High 02/14/2017    Rash Rosangela Cora Diphenhydramine Medium 02/14/2017    Itching Penicillins Medium 01/01/2013    Hives Tylenol [Acetaminophen] Medium 02/14/2017    Itching Aspirin  08/05/2015    Hives Azithromycin  08/11/2015    Other (comments) PATIENT STATED SHE CAN TOLERATE Z-MYESHA AS OF 11/2016 OR 1/2017 WHEN SHE RECEIVED IT FOR URI Bactrim Ds [Sulfamethoxazole-trimethoprim]  08/11/2015    Nausea and Vomiting Codeine  08/05/2015    Nausea and Vomiting Tylenol [Acetaminophen]  08/05/2015    Hives, Itching Sneezing and slight itching, eyes water Current Immunizations  Never Reviewed Name Date Influenza Vaccine (Quad) PF  Incomplete Pneumococcal Polysaccharide (PPSV-23) 11/12/2011 12:00 AM  
 Tdap 8/5/2015 11:55 PM  
  
 Not reviewed this visit You Were Diagnosed With   
  
 Codes Comments Encounter for immunization    -  Primary ICD-10-CM: E24 ICD-9-CM: V03.89 Abnormal bruising     ICD-10-CM: R23.8 ICD-9-CM: 921. 9 Vitals BP Pulse Temp Resp Height(growth percentile) 150/82 (BP 1 Location: Left arm, BP Patient Position: Sitting) 74 98.1 °F (36.7 °C) (Tympanic) 18 5' 8\" (1.727 m) Weight(growth percentile) SpO2 BMI OB Status Smoking Status 166 lb (75.3 kg) 98% 25.24 kg/m2 Hysterectomy Current Every Day Smoker BMI and BSA Data Body Mass Index Body Surface Area  
 25.24 kg/m 2 1.9 m 2 Preferred Pharmacy Pharmacy Name Phone [facility] [address] [place] [address] [phone] Your Updated Medication List  
  
   
This list is accurate as of: 11/21/17 11:09 AM.  Always use your most recent med list.  
  
  
  
  
 albuterol 90 mcg/actuation inhaler Commonly known as:  PROVENTIL HFA, VENTOLIN HFA, PROAIR HFA Take 1 Puff by inhalation every four (4) hours as needed for Wheezing. cyclobenzaprine 5 mg tablet Commonly known as:  FLEXERIL Take 1 Tab by mouth three (3) times daily as needed for Muscle Spasm(s). EPINEPHrine 0.3 mg/0.3 mL injection Commonly known as:  EPIPEN  
0.3 mL by IntraMUSCular route once as needed for up to 1 dose. * fluticasone 110 mcg/actuation inhaler Commonly known as:  FLOVENT HFA Take 1 Puff by inhalation every twelve (12) hours. * fluticasone 50 mcg/actuation nasal spray Commonly known as:  Sherita Jack 2 Sprays by Both Nostrils route daily. Indications: ALLERGIC RHINITIS  
  
 venlafaxine-SR 75 mg capsule Commonly known as:  EFFEXOR-XR Take 1 Cap by mouth daily. * Notice: This list has 2 medication(s) that are the same as other medications prescribed for you. Read the directions carefully, and ask your doctor or other care provider to review them with you. We Performed the Following ADMIN INFLUENZA VIRUS VAC [ \[facility]\""] INFLUENZA VIRUS VAC QUAD,SPLIT,PRESV FREE SYRINGE IM [id number] CPT(R)] To-Do List   
 11/21/2017 Lab:  CBC WITH AUTOMATED DIFF Patient Instructions Vaccine Information Statement Influenza (Flu) Vaccine (Inactivated or Recombinant): What you need to know Many Vaccine Information Statements are available in Bengali and other languages. See www.immunize.org/vis Hojas de Información Sobre Vacunas están disponibles en Español y en muchos otros idiomas. Visite www.immunize.org/vis 1. Why get vaccinated? Influenza (flu) is a contagious disease that spreads around the [place] every year, usually between October and May. Flu is caused by influenza viruses, and is spread mainly by coughing, sneezing, and close contact. Anyone can get flu. Flu strikes suddenly and can last several days. Symptoms vary by age, but can include: 
 fever/chills  sore throat  muscle aches  fatigue  cough  headache  runny or stuffy nose Flu can also lead to pneumonia and blood infections, and cause diarrhea and seizures in children. If you have a medical condition, such as heart or lung disease, flu can make it worse. Flu is more dangerous for some people. Infants and young children, people 72years of age and older, pregnant women, and people with certain health conditions or a weakened immune system are at greatest risk. Each year thousands of people in the [facility] die from flu, and many more are hospitalized.   
 
Flu vaccine can: 
 keep you from getting flu, 
 make flu less severe if you do get it, and 
  keep you from spreading flu to your family and other people. 2. Inactivated and recombinant flu vaccines A dose of flu vaccine is recommended every flu season. Children 6 months through 6years of age may need two doses during the same flu season. Everyone else needs only one dose each flu season. Some inactivated flu vaccines contain a very small amount of a mercury-based preservative called thimerosal. Studies have not shown thimerosal in vaccines to be harmful, but flu vaccines that do not contain thimerosal are available. There is no live flu virus in flu shots. They cannot cause the flu. There are many flu viruses, and they are always changing. Each year a new flu vaccine is made to protect against three or four viruses that are likely to cause disease in the upcoming flu season. But even when the vaccine doesnt exactly match these viruses, it may still provide some protection Flu vaccine cannot prevent: 
 flu that is caused by a virus not covered by the vaccine, or 
 illnesses that look like flu but are not. It takes about 2 weeks for protection to develop after vaccination, and protection lasts through the flu season. 3. Some people should not get this vaccine Tell the person who is giving you the vaccine:  If you have any severe, life-threatening allergies. If you ever had a life-threatening allergic reaction after a dose of flu vaccine, or have a severe allergy to any part of this vaccine, you may be advised not to get vaccinated. Most, but not all, types of flu vaccine contain a small amount of egg protein.  If you ever had Guillain-Barré Syndrome (also called GBS). Some people with a history of GBS should not get this vaccine. This should be discussed with your doctor.  If you are not feeling well. It is usually okay to get flu vaccine when you have a mild illness, but you might be asked to come back when you feel better. 4. Risks of a vaccine reaction With any medicine, including vaccines, there is a chance of reactions. These are usually mild and go away on their own, but serious reactions are also possible. Most people who get a flu shot do not have any problems with it. Minor problems following a flu shot include:  
 soreness, redness, or swelling where the shot was given  hoarseness  sore, red or itchy eyes  cough  fever  aches  headache  itching  fatigue If these problems occur, they usually begin soon after the shot and last 1 or 2 days. More serious problems following a flu shot can include the following:  There may be a small increased risk of Guillain-Barré Syndrome (GBS) after inactivated flu vaccine. This risk has been estimated at 1 or 2 additional cases per million people vaccinated. This is much lower than the risk of severe complications from flu, which can be prevented by flu vaccine.  Young children who get the flu shot along with pneumococcal vaccine (PCV13) and/or DTaP vaccine at the same time might be slightly more likely to have a seizure caused by fever. Ask your doctor for more information. Tell your doctor if a child who is getting flu vaccine has ever had a seizure. Problems that could happen after any injected vaccine:  People sometimes faint after a medical procedure, including vaccination. Sitting or lying down for about 15 minutes can help prevent fainting, and injuries caused by a fall. Tell your doctor if you feel dizzy, or have vision changes or ringing in the ears.  Some people get severe pain in the shoulder and have difficulty moving the arm where a shot was given. This happens very rarely.  Any medication can cause a severe allergic reaction. Such reactions from a vaccine are very rare, estimated at about 1 in a million doses, and would happen within a few minutes to a few hours after the vaccination. As with any medicine, there is a very remote chance of a vaccine causing a serious injury or death. The safety of vaccines is always being monitored. For more information, visit: www.cdc.gov/vaccinesafety/ 
 
5. What if there is a serious reaction? What should I look for?  Look for anything that concerns you, such as signs of a severe allergic reaction, very high fever, or unusual behavior. Signs of a severe allergic reaction can include hives, swelling of the face and throat, difficulty breathing, a fast heartbeat, dizziness, and weakness  usually within a few minutes to a few hours after the vaccination. What should I do?  If you think it is a severe allergic reaction or other emergency that cant wait, call 9-1-1 and get the person to the nearest hospital. Otherwise, call your doctor.  Reactions should be reported to the Vaccine Adverse Event Reporting System (VAERS). Your doctor should file this report, or you can do it yourself through  the VAERS web site at www.vaers. [facility].gov, or by calling 0-954.553.3082. VAERS does not give medical advice. 6. The National Vaccine Injury Compensation Program 
 
The [facility] Vaccine Injury Compensation Program (VICP) is a federal program that was created to compensate people who may have been injured by certain vaccines. Persons who believe they may have been injured by a vaccine can learn about the program and about filing a claim by calling 0-507.342.7899 or visiting the [employer] website at www.[facility].gov/vaccinecompensation. There is a time limit to file a claim for compensation. 7. How can I learn more?  Ask your healthcare provider. He or she can give you the vaccine package insert or suggest other sources of information.  Call your local or state health department.  Contact the Centers for Disease Control and Prevention (CDC): 
- Call 8-359.602.5987 (1-800-CDC-INFO) or 
- Visit CDCs website at www.cdc.gov/flu Vaccine Information Statement Inactivated Influenza Vaccine 8/7/2015 
42 SEBLE Tatum [id number] Department of [facility] and [employer] Centers for Disease Control and Prevention Office Use Only Introducing [facility] SERVICES! Ritu Angeles introduces [employer] patient portal. Now you can access parts of your medical record, email your doctor's office, and request medication refills online. 1. In your internet browser, go to [email or web address] 2. Click on the First Time User? Click Here link in the Sign In box. You will see the New Member Sign Up page. 3. Enter your [employer] Access Code exactly as it appears below. You will not need to use this code after youve completed the sign-up process. If you do not sign up before the expiration date, you must request a new code. · [employer] Access Code: [email or web address] Expires: 12/27/2017 12:55 PM 
 
4. Enter the last four digits of your Social Security Number (xxxx) and Date of Birth (mm/dd/yyyy) as indicated and click Submit. You will be taken to the next sign-up page. 5. Create a [employer] ID. This will be your [employer] login ID and cannot be changed, so think of one that is secure and easy to remember. 6. Create a [employer] password. You can change your password at any time. 7. Enter your Password Reset Question and Answer. This can be used at a later time if you forget your password. 8. Enter your e-mail address. You will receive e-mail notification when new information is available in [address] 9. Click Sign Up. You can now view and download portions of your medical record. 10. Click the Download Summary menu link to download a portable copy of your medical information. If you have questions, please visit the Frequently Asked Questions section of the [employer] website. Remember, [employer] is NOT to be used for urgent needs. For medical emergencies, dial 911. Now available from your iPhone and Android! Please provide this summary of care documentation to your next provider. Your primary care clinician is listed as LORNA GUADALUPE. If you have any questions after today's visit, please call [phone].

## 2017-11-22 ENCOUNTER — TELEPHONE (OUTPATIENT)
Dept: INTERNAL MEDICINE CLINIC | Age: 52
End: 2017-11-22

## 2017-11-22 NOTE — TELEPHONE ENCOUNTER
Contacted Irma and informed Her of CBC results per William Vanegas NP's request. Irma expressed understanding.

## 2017-11-22 NOTE — TELEPHONE ENCOUNTER
----- Message from Mendoza Pisano NP sent at 11/22/2017 11:47 AM EST -----  Please notify patient that lab results were reviewed and the results are ok

## 2017-11-27 ENCOUNTER — OFFICE VISIT (OUTPATIENT)
Dept: INTERNAL MEDICINE CLINIC | Age: 52
End: 2017-11-27

## 2017-11-27 VITALS
DIASTOLIC BLOOD PRESSURE: 98 MMHG | RESPIRATION RATE: 16 BRPM | HEART RATE: 64 BPM | OXYGEN SATURATION: 99 % | HEIGHT: 68 IN | WEIGHT: 166 LBS | BODY MASS INDEX: 25.16 KG/M2 | SYSTOLIC BLOOD PRESSURE: 144 MMHG | TEMPERATURE: 98 F

## 2017-11-27 DIAGNOSIS — F17.210 NICOTINE DEPENDENCE, CIGARETTES, UNCOMPLICATED: ICD-10-CM

## 2017-11-27 DIAGNOSIS — M54.12 CERVICAL RADICULOPATHY: ICD-10-CM

## 2017-11-27 DIAGNOSIS — R03.0 ELEVATED BLOOD PRESSURE READING WITHOUT DIAGNOSIS OF HYPERTENSION: Primary | ICD-10-CM

## 2017-11-27 RX ORDER — DIAZEPAM 5 MG/1
5 TABLET ORAL
Qty: 1 TAB | Refills: 0 | Status: SHIPPED | OUTPATIENT
Start: 2017-11-27 | End: 2018-05-31

## 2017-11-27 NOTE — PROGRESS NOTES
Licha Rivera is a 45 y.o. female presenting today for Elevated Blood Pressure  . HPI:  Licha Rivera presents to the office today for elevated blood pressure. Patient noted she has been in severe pain secondary to cervical neck pain. She notes she has been taking her blood pressure and its been elevated over the weekend. She denies any chest pain, palpitations or dyspnea. Patient noted she is scheduled for her MRI on 12/01/2017. Review of Systems   Respiratory: Negative for cough. Cardiovascular: Negative for chest pain and palpitations. Musculoskeletal: Positive for neck pain. Neurological: Positive for headaches. Allergies   Allergen Reactions    Latex, Natural Rubber Rash    Latex, Natural Rubber Hives    Amoxicillin Anaphylaxis    Amoxicillin Shortness of Breath and Swelling    Aspirin Itching    Bactrim Ds [Sulfamethoxazole-Trimethoprim] Diarrhea and Nausea and Vomiting    Benadryl [Diphenhydramine Hcl] Shortness of Breath and Swelling    Codeine Diarrhea, Itching and Nausea and Vomiting    Diphenhydramine Hcl Anaphylaxis    Levaquin [Levofloxacin] Rash     Thrush    Diphenhydramine Itching    Penicillins Hives    Tylenol [Acetaminophen] Itching    Aspirin Hives    Azithromycin Other (comments)     PATIENT STATED SHE CAN TOLERATE Z-MYESHA AS OF 11/2016 OR 1/2017 WHEN SHE RECEIVED IT FOR URI    Bactrim Ds [Sulfamethoxazole-Trimethoprim] Nausea and Vomiting    Codeine Nausea and Vomiting    Gabapentin Other (comments)     Patient noted heart palpitations    Tylenol [Acetaminophen] Hives and Itching     Sneezing and slight itching, eyes water       Current Outpatient Prescriptions   Medication Sig Dispense Refill    diazePAM (VALIUM) 5 mg tablet Take 1 Tab by mouth ON CALL for 1 dose. Take 30 minutes prior to MRI 1 Tab 0    venlafaxine-SR (EFFEXOR-XR) 75 mg capsule Take 1 Cap by mouth daily.  30 Cap 3    albuterol (PROVENTIL HFA, VENTOLIN HFA, PROAIR HFA) 90 mcg/actuation inhaler Take 1 Puff by inhalation every four (4) hours as needed for Wheezing. 1 Inhaler 11    fluticasone (FLOVENT HFA) 110 mcg/actuation inhaler Take 1 Puff by inhalation every twelve (12) hours. 1 Inhaler 11    fluticasone (FLONASE) 50 mcg/actuation nasal spray 2 Sprays by Both Nostrils route daily. Indications: ALLERGIC RHINITIS 1 Bottle 11    EPINEPHrine (EPIPEN) 0.3 mg/0.3 mL injection 0.3 mL by IntraMUSCular route once as needed for up to 1 dose. 1 Syringe 11    cyclobenzaprine (FLEXERIL) 5 mg tablet Take 1 Tab by mouth three (3) times daily as needed for Muscle Spasm(s). 30 Tab 3    gabapentin (NEURONTIN) 100 mg capsule Take 2 Caps by mouth nightly. 61 Cap 2       Past Medical History:   Diagnosis Date    Arthritis     Asthma     Cancer ([facility] [address] 2006    cervical    Cervical cancer ([facility] [address] 2006    Chronic obstructive pulmonary disease (HCC)     Chronic pain     Depression     H/O cervical spine surgery 2013    C3-7    Headache     Ill-defined condition     cervical cancer    Psychotic disorder     Anxiety and Panic Disorders    Right thyroid nodule 3/26/2017       Past Surgical History:   Procedure Laterality Date    HX HYSTERECTOMY      HX OTHER SURGICAL      C3-7 SX 8 years ago    HX TONSILLECTOMY      HX WISDOM TEETH EXTRACTION      x4       Social History     Social History    Marital status:      Spouse name: N/A    Number of children: N/A    Years of education: N/A     Occupational History    Not on file.      Social History Main Topics    Smoking status: Current Every Day Smoker     Packs/day: 1.00     Years: 31.00    Smokeless tobacco: Never Used    Alcohol use 0.0 oz/week     0 Standard drinks or equivalent per week      Comment: social    Drug use: No    Sexual activity: Yes     Other Topics Concern    Not on file     Social History Narrative    ** Merged History Encounter **            Patient does not have an advanced directive on file    Vitals: 11/27/17 1110   BP: (!) 144/98   Pulse: 64   Resp: 16   Temp: 98 °F (36.7 °C)   TempSrc: Tympanic   SpO2: 99%   Weight: 166 lb (75.3 kg)   Height: 5' 8\" (1.727 m)   PainSc:   6   PainLoc: Neck       Physical Exam   Constitutional: No distress. Cardiovascular: Normal rate, regular rhythm and normal heart sounds. Pulmonary/Chest: Effort normal and breath sounds normal.   Nursing note and vitals reviewed. Hospital Outpatient Visit on 11/21/2017   Component Date Value Ref Range Status    WBC 11/21/2017 6.7  4.6 - 13.2 K/uL Final    RBC 11/21/2017 4.89  4.20 - 5.30 M/uL Final    HGB 11/21/2017 15.9  12.0 - 16.0 g/dL Final    HCT 11/21/2017 48.4* 35.0 - 45.0 % Final    MCV 11/21/2017 99.0* 74.0 - 97.0 FL Final    MCH 11/21/2017 32.5  24.0 - 34.0 PG Final    MCHC 11/21/2017 32.9  31.0 - 37.0 g/dL Final    RDW 11/21/2017 13.0  11.6 - 14.5 % Final    PLATELET 44/16/9583 002  135 - 420 K/uL Final    MPV 11/21/2017 11.3  9.2 - 11.8 FL Final    NEUTROPHILS 11/21/2017 58  40 - 73 % Final    LYMPHOCYTES 11/21/2017 31  21 - 52 % Final    MONOCYTES 11/21/2017 7  3 - 10 % Final    EOSINOPHILS 11/21/2017 3  0 - 5 % Final    BASOPHILS 11/21/2017 1  0 - 2 % Final    ABS. NEUTROPHILS 11/21/2017 3.9  1.8 - 8.0 K/UL Final    ABS. LYMPHOCYTES 11/21/2017 2.1  0.9 - 3.6 K/UL Final    ABS. MONOCYTES 11/21/2017 0.5  0.05 - 1.2 K/UL Final    ABS. EOSINOPHILS 11/21/2017 0.2  0.0 - 0.4 K/UL Final    ABS. BASOPHILS 11/21/2017 0.0  0.0 - 0.06 K/UL Final    DF 11/21/2017 AUTOMATED    Final       .No results found for any visits on 11/27/17. Assessment / Plan:      ICD-10-CM ICD-9-CM    1. Elevated blood pressure reading without diagnosis of hypertension R03.0 796.2    2. Nicotine dependence, cigarettes, uncomplicated G51.958 623.0    3.  Cervical radiculopathy M54.12 723.4 diazePAM (VALIUM) 5 mg tablet     Blood pressure elevated  Lifestyle modifications discussed  Patient scheduled for MRI- Valium 5 mg tablet given (patient informed she must have a -noted her  is scheduled to drive her)  F/u in 2 weeks for b/p recheck      Follow-up Disposition:  Return in about 2 weeks (around 12/11/2017). I asked the patient if she  had any questions and answered her  questions.   The patient stated that she understands the treatment plan and agrees with the treatment plan

## 2017-11-27 NOTE — MR AVS SNAPSHOT
Visit Information Date & Time Provider Department Dept. Phone Encounter #  
 11/27/2017 11:15 AM Brennan Kapoor NP [facility] INTERNAL MEDICINE OF Paty Do [phone] [id number] Upcoming Health Maintenance Date Due COLONOSCOPY 8/11/1981 BREAST CANCER SCRN MAMMOGRAM 8/11/2013 PAP AKA CERVICAL CYTOLOGY 2/14/2020 DTaP/Tdap/Td series (2 - Td) 8/5/2025 Allergies as of 11/27/2017  Review Complete On: 11/27/2017 By: Janina Mccord LPN Severity Noted Reaction Type Reactions Latex, Natural Rubber Medium 02/14/2017    Rash Latex, Natural Rubber  08/11/2015    Hives Amoxicillin High 08/05/2015    Anaphylaxis Amoxicillin High 02/14/2017    Shortness of Breath, Swelling Aspirin High 02/14/2017    Itching Bactrim Ds [Sulfamethoxazole-trimethoprim] High 02/14/2017    Diarrhea, Nausea and Vomiting Benadryl [Diphenhydramine Hcl] High 02/14/2017    Shortness of Breath, Swelling Codeine High 02/14/2017    Diarrhea, Itching, Nausea and Vomiting Diphenhydramine Hcl High 08/11/2015    Anaphylaxis Levaquin [Levofloxacin] High 02/14/2017    Rash Rosy Alcocer Diphenhydramine Medium 02/14/2017    Itching Penicillins Medium 01/01/2013    Hives Tylenol [Acetaminophen] Medium 02/14/2017    Itching Aspirin  08/05/2015    Hives Azithromycin  08/11/2015    Other (comments) PATIENT STATED SHE CAN TOLERATE Z-MYESHA AS OF 11/2016 OR 1/2017 WHEN SHE RECEIVED IT FOR URI Bactrim Ds [Sulfamethoxazole-trimethoprim]  08/11/2015    Nausea and Vomiting Codeine  08/05/2015    Nausea and Vomiting Gabapentin  11/27/2017    Other (comments) Patient noted heart palpitations Tylenol [Acetaminophen]  08/05/2015    Hives, Itching Sneezing and slight itching, eyes water Current Immunizations  Reviewed on 11/21/2017 Name Date Influenza Vaccine (Quad) PF 11/21/2017  Pneumococcal Polysaccharide (PPSV-23) 11/12/2011 12:00 AM  
 Tdap 8/5/2015 11:55 PM  
  
 Not reviewed this visit You Were Diagnosed With   
  
 Codes Comments Elevated blood pressure reading without diagnosis of hypertension    -  Primary ICD-10-CM: R03.0 ICD-9-CM: 796.2 Nicotine dependence, cigarettes, uncomplicated     XXI-55-VQ: F17.210 ICD-9-CM: 305.1 Cervical radiculopathy     ICD-10-CM: M54.12 
ICD-9-CM: 723.4 Vitals BP Pulse Temp Resp Height(growth percentile) Weight(growth percentile) (!) 144/98 (BP 1 Location: Left arm, BP Patient Position: Sitting) 64 98 °F (36.7 °C) (Tympanic) 16 5' 8\" (1.727 m) 166 lb (75.3 kg) SpO2 BMI OB Status Smoking Status 99% 25.24 kg/m2 Hysterectomy Current Every Day Smoker Vitals History BMI and BSA Data Body Mass Index Body Surface Area  
 25.24 kg/m 2 1.9 m 2 Preferred Pharmacy Pharmacy Name [facility], [address] [place] [address],[facility] [address] [place] [address] [phone] Your Updated Medication List  
  
   
This list is accurate as of: 11/27/17 12:19 PM.  Always use your most recent med list.  
  
  
  
  
 albuterol 90 mcg/actuation inhaler Commonly known as:  PROVENTIL HFA, VENTOLIN HFA, PROAIR HFA Take 1 Puff by inhalation every four (4) hours as needed for Wheezing. cyclobenzaprine 5 mg tablet Commonly known as:  FLEXERIL Take 1 Tab by mouth three (3) times daily as needed for Muscle Spasm(s). diazePAM 5 mg tablet Commonly known as:  VALIUM Take 1 Tab by mouth ON CALL for 1 dose. Take 30 minutes prior to MRI EPINEPHrine 0.3 mg/0.3 mL injection Commonly known as:  EPIPEN  
0.3 mL by IntraMUSCular route once as needed for up to 1 dose. * fluticasone 110 mcg/actuation inhaler Commonly known as:  FLOVENT HFA Take 1 Puff by inhalation every twelve (12) hours. * fluticasone 50 mcg/actuation nasal spray Commonly known as:  Dannie Metro 2 Sprays by Both Nostrils route daily. Indications: ALLERGIC RHINITIS gabapentin 100 mg capsule Commonly known as:  NEURONTIN Take 2 Caps by mouth nightly. venlafaxine-SR 75 mg capsule Commonly known as:  EFFEXOR-XR Take 1 Cap by mouth daily. * Notice: This list has 2 medication(s) that are the same as other medications prescribed for you. Read the directions carefully, and ask your doctor or other care provider to review them with you. Prescriptions Printed Refills  
 diazePAM (VALIUM) 5 mg tablet 0 Sig: Take 1 Tab by mouth ON CALL for 1 dose. Take 30 minutes prior to MRI Class: Print Route: Oral  
  
To-Do List   
 12/01/2017 5:00 PM  
  Appointment with HBV MRI RM 2 at [facility] ([phone]) GENERAL INSTRUCTIONS  Bring information (ID card) if you have any medically implanted devices. You will be required to lie still while the procedure is being performed. Remove any jewelry (including body piercing, hairpins) prior to MRI. If you have had a creatinine level drawn within the past 30 days, please bring most recent results to your appt. Bring any films, CD's, and reports related to your study with you on the day of your exam.  This only includes studies done outside of [address] [place] [address], \[facility] [place] [facility]\"", Rosana Garcia, and Nuzhat Royal. Bring a complete list of all medications you are currently taking to include prescriptions, over-the-counter meds, herbals, vitamins & any dietary supplements. If you were given medications for claustrophobia or anxiety, please arrange to have someone drive you to your appointment. QUESTIONS  Notify the MRI Department if you have any questions concerning your study. Rosana Lindsey [phone]  
  
 12/01/2017 6:00 PM  
  Appointment with HBV MRI RM 2 at [address] [facility] ([phone]) GENERAL INSTRUCTIONS  Bring information (ID card) if you have any medically implanted devices.   You will be required to lie still while the procedure is being performed. Remove any jewelry (including body piercing, hairpins) prior to MRI. If you have had a creatinine level drawn within the past 30 days, please bring most recent results to your appt. Bring any films, CD's, and reports related to your study with you on the day of your exam.  This only includes studies done outside of [address] [place] [address], [facility], [place], and Chasidy Cardozo. Bring a complete list of all medications you are currently taking to include prescriptions, over-the-counter meds, herbals, vitamins & any dietary supplements. If you were given medications for claustrophobia or anxiety, please arrange to have someone drive you to your appointment. QUESTIONS  Notify the MRI Department if you have any questions concerning your study. Tona Lindsey [phone]  
  
 12/08/2017 4:00 PM  
  Appointment with ALICIA ARANA at [address] [place] [address] ([phone]) PAYMENT  For Non-Medicare patients - $15.00 will be collected from you at the time of your exam.  You will be billed $35.00 from the reading [facility]. OUTSIDE FILMS  - Any outside films related to the study being scheduled should be brought with you on the day of the exam.  If this cannot be done there may be a delay in the reading of the study. MEDICATIONS  - Patient must bring a complete list of all medications currently taking to include prescriptions, over-the-counter meds, herbals, vitamins & any dietary supplements  GENERAL INSTRUCTIONS  - On the day of your exam do not use any bath powder, deodorant or lotions on the armpit area. -Tenderness of breasts may cause an increase of discomfort during procedure. If you are experiencing breast tenderness on the day of your appointment and would like to reschedule, please call [phone]. Introducing [address]! [facility] introduces [employer] patient portal. Now you can access parts of your medical record, email your doctor's office, and request medication refills online. 1. In your internet browser, go to [email or web address] 2. Click on the First Time User? Click Here link in the Sign In box. You will see the New Member Sign Up page. 3. Enter your [employer] Access Code exactly as it appears below. You will not need to use this code after youve completed the sign-up process. If you do not sign up before the expiration date, you must request a new code. · [employer] Access Code: [email or web address] Expires: 12/27/2017 12:55 PM 
 
4. Enter the last four digits of your Social Security Number (xxxx) and Date of Birth (mm/dd/yyyy) as indicated and click Submit. You will be taken to the next sign-up page. 5. Create a BitGym ID. This will be your BitG[employer] login ID and cannot be changed, so think of one that is secure and easy to remember. 6. Create a BitGym password. You can change your password at any time. 7. Enter your Password Reset Question and Answer. This can be used at a later time if you forget your password. 8. Enter your e-mail address. You will receive e-mail notification when new information is available in [address] 9. Click Sign Up. You can now view and download portions of your medical record. 10. Click the Download Summary menu link to download a portable copy of your medical information. If you have questions, please visit the Frequently Asked Questions section of the [employer] website. Remember, [employer] is NOT to be used for urgent needs. For medical emergencies, dial 911. Now available from your iPhone and Android! Please provide this summary of care documentation to your next provider. Your primary care clinician is listed as LORNA GUADALUPE. If you have any questions after today's visit, please call [phone].

## 2018-01-23 ENCOUNTER — OFFICE VISIT (OUTPATIENT)
Dept: ORTHOPEDIC SURGERY | Age: 53
End: 2018-01-23

## 2018-01-23 VITALS
TEMPERATURE: 97.6 F | DIASTOLIC BLOOD PRESSURE: 98 MMHG | SYSTOLIC BLOOD PRESSURE: 156 MMHG | HEART RATE: 91 BPM | WEIGHT: 166 LBS | BODY MASS INDEX: 25.16 KG/M2 | HEIGHT: 68 IN | OXYGEN SATURATION: 97 %

## 2018-01-23 DIAGNOSIS — S92.351A CLOSED DISPLACED FRACTURE OF FIFTH METATARSAL BONE OF RIGHT FOOT, INITIAL ENCOUNTER: Primary | ICD-10-CM

## 2018-01-23 DIAGNOSIS — M25.572 LEFT ANKLE PAIN, UNSPECIFIED CHRONICITY: ICD-10-CM

## 2018-01-23 RX ORDER — TRAMADOL HYDROCHLORIDE 50 MG/1
50 TABLET ORAL
COMMUNITY
Start: 2018-01-21 | End: 2018-05-31 | Stop reason: SDUPTHER

## 2018-01-23 RX ORDER — OXYCODONE HYDROCHLORIDE 5 MG/1
5 CAPSULE ORAL
Qty: 40 CAP | Refills: 0 | Status: SHIPPED | OUTPATIENT
Start: 2018-01-23 | End: 2018-01-29 | Stop reason: SDUPTHER

## 2018-01-23 NOTE — PATIENT INSTRUCTIONS
Please follow up in 2 weeks. You are advised to contact us if your condition worsens. Foot Pain: Care Instructions  Your Care Instructions  Foot injuries that cause pain and swelling are fairly common. Almost all sports or home repair projects can cause a misstep that ends up as foot pain. Normal wear and tear, especially as you get older, also can cause foot pain. Most minor foot injuries will heal on their own, and home treatment is usually all you need to do. If you have a severe injury, you may need tests and treatment. Follow-up care is a key part of your treatment and safety. Be sure to make and go to all appointments, and call your doctor if you are having problems. It's also a good idea to know your test results and keep a list of the medicines you take. How can you care for yourself at home? · Take pain medicines exactly as directed. ¨ If the doctor gave you a prescription medicine for pain, take it as prescribed. ¨ If you are not taking a prescription pain medicine, ask your doctor if you can take an over-the-counter medicine. · Rest and protect your foot. Take a break from any activity that may cause pain. · Put ice or a cold pack on your foot for 10 to 20 minutes at a time. Put a thin cloth between the ice and your skin. · Prop up the sore foot on a pillow when you ice it or anytime you sit or lie down during the next 3 days. Try to keep it above the level of your heart. This will help reduce swelling. · Your doctor may recommend that you wrap your foot with an elastic bandage. Keep your foot wrapped for as long as your doctor advises. · If your doctor recommends crutches, use them as directed. · Wear roomy footwear. · As soon as pain and swelling end, begin gentle exercises of your foot. Your doctor can tell you which exercises will help. When should you call for help? Call 911 anytime you think you may need emergency care.  For example, call if:  ? · Your foot turns pale, white, blue, or cold. ?Call your doctor now or seek immediate medical care if:  ? · You cannot move or stand on your foot. ? · Your foot looks twisted or out of its normal position. ? · Your foot is not stable when you step down. ? · You have signs of infection, such as:  ¨ Increased pain, swelling, warmth, or redness. ¨ Red streaks leading from the sore area. ¨ Pus draining from a place on your foot. ¨ A fever. ? · Your foot is numb or tingly. ? Watch closely for changes in your health, and be sure to contact your doctor if:  ? · You do not get better as expected. ? · You have bruises from an injury that last longer than 2 weeks. Where can you learn more? Go to http://star-sofía.info/. Enter H619 in the search box to learn more about \"Foot Pain: Care Instructions. \"  Current as of: March 21, 2017  Content Version: 11.4  © 7887-1818 [employer]. Care instructions adapted under license by [employer] (which disclaims liability or warranty for this information). If you have questions about a medical condition or this instruction, always ask your healthcare professional. [address] any warranty or liability for your use of this information.

## 2018-01-23 NOTE — MR AVS SNAPSHOT
[address] [place] [address], [address] [place] [address] 
[phone] Patient: Leon Deshpande MRN: [id number] Visit Information Date & Time Provider Department Dept. Phone Encounter #  
 2018  3:30 PM Ida Estrada, [facility] [phone] [id number] Upcoming Health Maintenance Date Due COLONOSCOPY 1981 BREAST CANCER SCRN MAMMOGRAM 2013 PAP AKA CERVICAL CYTOLOGY 2020 DTaP/Tdap/Td series (2 - Td) 2025 Allergies as of 2018  Review Complete On: 2018 By: Ida Estrada MD  
  
 Severity Noted Reaction Type Reactions Latex, Natural Rubber Medium 2017    Rash Latex, Natural Rubber  2015    Hives Amoxicillin High 2015    Anaphylaxis Amoxicillin High 2017    Shortness of Breath, Swelling Aspirin High 2017    Itching Bactrim Ds [Sulfamethoxazole-trimethoprim] High 2017    Diarrhea, Nausea and Vomiting Benadryl [Diphenhydramine Hcl] High 2017    Shortness of Breath, Swelling Codeine High 2017    Diarrhea, Itching, Nausea and Vomiting Diphenhydramine Hcl High 2015    Anaphylaxis Levaquin [Levofloxacin] High 2017    Rash [place] Art Diphenhydramine Medium 2017    Itching Penicillins Medium 2013    Hives Tylenol [Acetaminophen] Medium 2017    Itching Aspirin  2015    Hives Azithromycin  2015    Other (comments) PATIENT STATED SHE CAN TOLERATE Z-MYESHA AS OF 2016 OR 2017 WHEN SHE RECEIVED IT FOR URI Bactrim Ds [Sulfamethoxazole-trimethoprim]  2015    Nausea and Vomiting Codeine  2015    Nausea and Vomiting Gabapentin  2017    Other (comments) Patient noted heart palpitations Tylenol [Acetaminophen]  2015    Hives, Itching Sneezing and slight itching, eyes water Current Immunizations  Reviewed on 11/21/2017 Name Date Influenza Vaccine (Quad) PF 11/21/2017 Pneumococcal Polysaccharide (PPSV-23) 11/12/2011 12:00 AM  
 Tdap 8/5/2015 11:55 PM  
  
 Not reviewed this visit You Were Diagnosed With   
  
 Codes Comments Left ankle pain, unspecified chronicity    -  Primary ICD-10-CM: F44.233 ICD-9-CM: 719.47 Closed displaced fracture of fifth metatarsal bone of right foot, initial encounter     ICD-10-CM: Q16.940L ICD-9-CM: 825.25 Vitals BP Pulse Temp Height(growth percentile) Weight(growth percentile) SpO2  
 (!) 156/98 91 97.6 °F (36.4 °C) (Oral) 5' 8\" (1.727 m) 166 lb (75.3 kg) 97% BMI OB Status Smoking Status 25.24 kg/m2 Hysterectomy Current Every Day Smoker BMI and BSA Data Body Mass Index Body Surface Area  
 25.24 kg/m 2 1.9 m 2 Preferred Pharmacy Pharmacy Name [facility] [address],[facility] [address] [place] [address] [phone] Your Updated Medication List  
  
   
This list is accurate as of: 1/23/18  3:34 PM.  Always use your most recent med list.  
  
  
  
  
 albuterol 90 mcg/actuation inhaler Commonly known as:  PROVENTIL HFA, VENTOLIN HFA, PROAIR HFA Take 1 Puff by inhalation every four (4) hours as needed for Wheezing. cyclobenzaprine 5 mg tablet Commonly known as:  FLEXERIL Take 1 Tab by mouth three (3) times daily as needed for Muscle Spasm(s). diazePAM 5 mg tablet Commonly known as:  VALIUM Take 1 Tab by mouth ON CALL for 1 dose. Take 30 minutes prior to MRI EPINEPHrine 0.3 mg/0.3 mL injection Commonly known as:  EPIPEN  
0.3 mL by IntraMUSCular route once as needed for up to 1 dose. * fluticasone 110 mcg/actuation inhaler Commonly known as:  FLOVENT HFA Take 1 Puff by inhalation every twelve (12) hours. * fluticasone 50 mcg/actuation nasal spray Commonly known as:  Jordyn Martínez 2 Sprays by Both Nostrils route daily. Indications: ALLERGIC RHINITIS  
  
 gabapentin 100 mg capsule Commonly known as:  NEURONTIN Take 2 Caps by mouth nightly. oxyCODONE 5 mg capsule Commonly known as:  OXYIR Take 1 Cap by mouth three (3) times daily as needed. Max Daily Amount: 15 mg.  
  
 traMADol 50 mg tablet Commonly known as:  ULTRAM  
50 mg.  
  
 venlafaxine-SR 75 mg capsule Commonly known as:  EFFEXOR-XR Take 1 Cap by mouth daily. * Notice: This list has 2 medication(s) that are the same as other medications prescribed for you. Read the directions carefully, and ask your doctor or other care provider to review them with you. Prescriptions Printed Refills  
 oxyCODONE (OXYIR) 5 mg capsule 0 Sig: Take 1 Cap by mouth three (3) times daily as needed. Max Daily Amount: 15 mg.  
 Class: Print Route: Oral  
  
We Performed the Following AMB POC XRAY, ANKLE; COMPLETE, 3+ VIE [06037 CPT(R)] AMB SUPPLY ORDER [[id number] Custom] Comments:  
 Short Right CAM walker AMB SUPPLY ORDER [[id number] Custom] Comments:  
 Knee scooter [place] CLOSED RX METATARSAL FX [id number] CPT(R)] Introducing [facility]! Ruth Ann Arzola introduces [employer] patient portal. Now you can access parts of your medical record, email your doctor's office, and request medication refills online. 1. In your internet browser, go to [email or web address] 2. Click on the First Time User? Click Here link in the Sign In box. You will see the New Member Sign Up page. 3. Enter your [employer] Access Code exactly as it appears below. You will not need to use this code after youve completed the sign-up process. If you do not sign up before the expiration date, you must request a new code. · [employer] Access Code: [email or web address] Expires: 4/13/2018 10:29 AM 
 
4.  Enter the last four digits of your Social Security Number (xxxx) and Date of Birth (mm/dd/yyyy) as indicated and click Submit. You will be taken to the next sign-up page. 5. Create a [employer]Ru ID. This will be your [employer] login ID and cannot be changed, so think of one that is secure and easy to remember. 6. Create a [employer]Ru password. You can change your password at any time. 7. Enter your Password Reset Question and Answer. This can be used at a later time if you forget your password. 8. Enter your e-mail address. You will receive e-mail notification when new information is available in [address] 9. Click Sign Up. You can now view and download portions of your medical record. 10. Click the Download Summary menu link to download a portable copy of your medical information. If you have questions, please visit the Frequently Asked Questions section of the [employer] website. Remember, [employer] is NOT to be used for urgent needs. For medical emergencies, dial 911. Now available from your iPhone and Android! Please provide this summary of care documentation to your next provider. Your primary care clinician is listed as LORNA GUADALUPE. If you have any questions after today's visit, please call [phone].

## 2018-01-23 NOTE — PROGRESS NOTES
FOOT COMPLETE RT1/21/2018  [facility]  Result Impression   IMPRESSION:    5th metatarsal shaft fracture. Result Narrative   EXAM: Right foot series, 3 views    INDICATION: Complaining of right ankle injury this morning, bruising on top of foot    COMPARISON: None.    _______________    FINDINGS:    Oblique fracture of the 5th metatarsal shaft is present with minimal apex lateral angulation and medial displacement of the distal component.  1st MTP joint is narrowed with small marginal osteophytes. _______________   2018 January  KNEE COMPLETE RT 4 VIEWS1/21/2018  [facility]  Result Impression   Impression:    No fracture, or other acute osseous abnormality. Result Narrative   Clinical history: Trauma, knee pain. Comparison examinations: None. Technique: Four views of the right knee. Findings:    No fracture, dislocation or other acute osseous abnormality. Mild linear bands of nonspecific sclerosis noted within the distal femoral shaft. The soft tissues are unremarkable. There is no significant suprapatellar effusion. Artifact related to clothing noted. Status Results Details     Encounter Summary   FOOT COMPLETE RT1/21/2018  [facility]  Result Impression   IMPRESSION:    5th metatarsal shaft fracture. Result Narrative   EXAM: Right foot series, 3 views    INDICATION: Complaining of right ankle injury this morning, bruising on top of foot    COMPARISON: None.    _______________    FINDINGS:    Oblique fracture of the 5th metatarsal shaft is present with minimal apex lateral angulation and medial displacement of the distal component.  1st MTP joint is narrowed with small marginal osteophytes.     _______________   Status Results Details     Encounter Summary

## 2018-01-23 NOTE — PROCEDURES
X-rays of the right ankle, three views, AP, lateral, and oblique:  I see no acute fracture, subluxation, or dislocation to the ankle. There is, however, a fracture to the middle diaphyseal region of her right fifth metatarsal on these x-rays from today in my office.
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
## 2018-02-06 ENCOUNTER — OFFICE VISIT (OUTPATIENT)
Dept: ORTHOPEDIC SURGERY | Age: 53
End: 2018-02-06

## 2018-02-06 VITALS
HEART RATE: 87 BPM | DIASTOLIC BLOOD PRESSURE: 88 MMHG | TEMPERATURE: 96.4 F | OXYGEN SATURATION: 99 % | SYSTOLIC BLOOD PRESSURE: 146 MMHG

## 2018-02-06 DIAGNOSIS — M25.572 LEFT ANKLE PAIN, UNSPECIFIED CHRONICITY: ICD-10-CM

## 2018-02-06 DIAGNOSIS — S92.354D CLOSED NONDISPLACED FRACTURE OF FIFTH METATARSAL BONE OF RIGHT FOOT WITH ROUTINE HEALING, SUBSEQUENT ENCOUNTER: Primary | ICD-10-CM

## 2018-02-06 DIAGNOSIS — S92.351A CLOSED DISPLACED FRACTURE OF FIFTH METATARSAL BONE OF RIGHT FOOT, INITIAL ENCOUNTER: ICD-10-CM

## 2018-02-06 RX ORDER — OXYCODONE HYDROCHLORIDE 5 MG/1
5-10 CAPSULE ORAL
Qty: 20 CAP | Refills: 0 | Status: SHIPPED | OUTPATIENT
Start: 2018-02-06 | End: 2018-02-09 | Stop reason: SDUPTHER

## 2018-02-06 NOTE — MR AVS SNAPSHOT
[address] [place] [address], [address] 769 [facility] 
[phone] Patient: Dwight Abraham MRN: [id number] :1965 Visit Information Date & Time Provider Department Dept. Phone Encounter #  
 2018  3:15 PM Anthony Ball, 27 [facility] [id number] [phone] Follow-up Instructions Return in about 3 weeks (around 2018). Follow-up and Disposition History Upcoming Health Maintenance Date Due COLONOSCOPY 1981 BREAST CANCER SCRN MAMMOGRAM 2013 PAP AKA CERVICAL CYTOLOGY 2020 DTaP/Tdap/Td series (2 - Td) 2025 Allergies as of 2018  Review Complete On: 2018 By: Anthony Ball MD  
  
 Severity Noted Reaction Type Reactions Latex, Natural Rubber Medium 2017    Rash Latex, Natural Rubber  2015    Hives Amoxicillin High 2015    Anaphylaxis Amoxicillin High 2017    Shortness of Breath, Swelling Aspirin High 2017    Itching Bactrim Ds [Sulfamethoxazole-trimethoprim] High 2017    Diarrhea, Nausea and Vomiting Benadryl [Diphenhydramine Hcl] High 2017    Shortness of Breath, Swelling Codeine High 2017    Diarrhea, Itching, Nausea and Vomiting Diphenhydramine Hcl High 2015    Anaphylaxis Levaquin [Levofloxacin] High 2017    Rash Aundra Bicker Diphenhydramine Medium 2017    Itching Penicillins Medium 2013    Hives Tylenol [Acetaminophen] Medium 2017    Itching Aspirin  2015    Hives Azithromycin  2015    Other (comments) PATIENT STATED SHE CAN TOLERATE Z-MYESHA AS OF 2016 OR 2017 WHEN SHE RECEIVED IT FOR URI Bactrim Ds [Sulfamethoxazole-trimethoprim]  2015    Nausea and Vomiting Codeine  2015    Nausea and Vomiting Gabapentin  2017    Other (comments) Patient noted heart palpitations Tylenol [Acetaminophen]  08/05/2015    Hives, Itching Sneezing and slight itching, eyes water Current Immunizations  Reviewed on 11/21/2017 Name Date Influenza Vaccine (Quad) PF 11/21/2017 Pneumococcal Polysaccharide (PPSV-23) 11/12/2011 12:00 AM  
 Tdap 8/5/2015 11:55 PM  
  
 Not reviewed this visit You Were Diagnosed With   
  
 Codes Comments Closed nondisplaced fracture of fifth metatarsal bone of right foot with routine healing, subsequent encounter    -  Primary ICD-10-CM: K07.280B ICD-9-CM: V54.19 Left ankle pain, unspecified chronicity     ICD-10-CM: M25.572 ICD-9-CM: 719.47 Closed displaced fracture of fifth metatarsal bone of right foot, initial encounter     ICD-10-CM: F98.579D ICD-9-CM: 825.25 Vitals BP Pulse Temp SpO2 OB Status Smoking Status 146/88 87 96.4 °F (35.8 °C) (Oral) 99% Hysterectomy Current Every Day Smoker Preferred Pharmacy Pharmacy Name [facility] [address],[facility] [address] [place] [address] [phone] Your Updated Medication List  
  
   
This list is accurate as of: 2/6/18  4:10 PM.  Always use your most recent med list.  
  
  
  
  
 albuterol 90 mcg/actuation inhaler Commonly known as:  PROVENTIL HFA, VENTOLIN HFA, PROAIR HFA Take 1 Puff by inhalation every four (4) hours as needed for Wheezing. cyclobenzaprine 5 mg tablet Commonly known as:  FLEXERIL Take 1 Tab by mouth three (3) times daily as needed for Muscle Spasm(s). diazePAM 5 mg tablet Commonly known as:  VALIUM Take 1 Tab by mouth ON CALL for 1 dose. Take 30 minutes prior to MRI EPINEPHrine 0.3 mg/0.3 mL injection Commonly known as:  EPIPEN  
0.3 mL by IntraMUSCular route once as needed for up to 1 dose. * fluticasone 110 mcg/actuation inhaler Commonly known as:  FLOVENT HFA Take 1 Puff by inhalation every twelve (12) hours. * fluticasone 50 mcg/actuation nasal spray Commonly known as:  Mackenzie Miguel 2 Sprays by Both Nostrils route daily. Indications: ALLERGIC RHINITIS  
  
 gabapentin 100 mg capsule Commonly known as:  NEURONTIN Take 2 Caps by mouth nightly. oxyCODONE 5 mg capsule Commonly known as:  OXYIR Take 1-2 Caps by mouth two (2) times daily as needed. Max Daily Amount: 20 mg.  
  
 traMADol 50 mg tablet Commonly known as:  ULTRAM  
50 mg.  
  
 venlafaxine-SR 75 mg capsule Commonly known as:  EFFEXOR-XR Take 1 Cap by mouth daily. * Notice: This list has 2 medication(s) that are the same as other medications prescribed for you. Read the directions carefully, and ask your doctor or other care provider to review them with you. Prescriptions Printed Refills  
 oxyCODONE (OXYIR) 5 mg capsule 0 Sig: Take 1-2 Caps by mouth two (2) times daily as needed. Max Daily Amount: 20 mg.  
 Class: Print Route: Oral  
  
We Performed the Following AMB POC XRAY, FOOT; COMPLETE, 3+ VIEW [45391 CPT(R)] Follow-up Instructions Return in about 3 weeks (around 2/27/2018). Patient Instructions Please follow up in 3 weeks. You are advised to contact us if your condition worsens. Broken Foot: Care Instructions Your Care Instructions A broken foot, or foot fracture, is a break in one or more of the bones in your foot. It may happen because of a sports injury, a fall, or other accident. A compound, or open, fracture occurs when a bone breaks through the skin. A break that does not poke through the skin is a closed fracture. Your treatment depends on the location and type of break in your foot. You may need a splint, a cast, or an orthopedic shoe. Certain kinds of injuries may need surgery at some time. Whatever your treatment, you can ease symptoms and help your foot heal with care at home. You may need 6 to 8 weeks or more to fully heal. 
You heal best when you take good care of yourself.  Eat a variety of healthy foods, and don't smoke. Follow-up care is a key part of your treatment and safety. Be sure to make and go to all appointments, and call your doctor if you are having problems. It's also a good idea to know your test results and keep a list of the medicines you take. How can you care for yourself at home? · Be safe with medicines. Take pain medicines exactly as directed. ¨ If the doctor gave you a prescription medicine for pain, take it as prescribed. ¨ If you are not taking a prescription pain medicine, ask your doctor if you can take an over-the-counter medicine. · Leave the splint on until your follow-up appointment. Do not put any weight on the injured foot. If you were given crutches, use them as directed. · Put ice or a cold pack on your foot for 10 to 20 minutes at a time. Try to do this every 1 to 2 hours for the next 3 days (when you are awake) or until the swelling goes down. Put a thin cloth between the ice and your skin. · Prop up the sore foot on a pillow anytime you sit or lie down during the next 3 days. Try to keep it above the level of your heart. This will help reduce swelling. · Follow the cast care instructions your doctor gives you. If you have a splint, do not take it off unless your doctor tells you to. Cast and splint care · If you have a removable splint, ask your doctor if it is okay to remove it to bathe. Your doctor may want you to keep it on as much as possible. · Keep your plaster splint covered by taping a sheet of plastic around it when you bathe. Water under the plaster can cause your skin to itch and hurt. · Never cut your splint. When should you call for help? Call 911 anytime you think you may need emergency care. For example, call if: 
? · You have chest pain, are short of breath, or you cough up blood. ?Call your doctor now or seek immediate medical care if: 
? · You have new or worse pain. ? · Your foot is cool or pale or changes color. ? · You have tingling, weakness, or numbness in your toes. ? · Your cast or splint feels too tight. ? · You have signs of a blood clot in your leg (called a deep vein thrombosis), such as: 
¨ Pain in your calf, back of the knee, thigh, or groin. ¨ Redness or swelling in your leg. ? Watch closely for changes in your health, and be sure to contact your doctor if: 
? · You have a problem with your splint or cast.  
? · You do not get better as expected. Where can you learn more? Go to http://star-sofía.info/. Enter H599 in the search box to learn more about \"Broken Foot: Care Instructions. \" Current as of: March 21, 2017 Content Version: 11.4 © 0051-3569 [employer]. Care instructions adapted under license by [employer] (which disclaims liability or warranty for this information). If you have questions about a medical condition or this instruction, always ask your healthcare professional. [place] [address] any warranty or liability for your use of this information. Patient Instructions History Introducing [address] Dale [employer] introduces [employer] patient portal. Now you can access parts of your medical record, email your doctor's office, and request medication refills online. 1. In your internet browser, go to [email or web address] 2. Click on the First Time User? Click Here link in the Sign In box. You will see the New Member Sign Up page. 3. Enter your [employer] Access Code exactly as it appears below. You will not need to use this code after youve completed the sign-up process. If you do not sign up before the expiration date, you must request a new code. · [employer] Access Code: [email or web address] Expires: 4/13/2018 10:29 AM 
 
4. Enter the last four digits of your Social Security Number (xxxx) and Date of Birth (mm/dd/yyyy) as indicated and click Submit. You will be taken to the next sign-up page. 5. Create a "[employer] ID. This will be your "[employer] login ID and cannot be changed, so think of one that is secure and easy to remember. 6. Create a "[employer] password. You can change your password at any time. 7. Enter your Password Reset Question and Answer. This can be used at a later time if you forget your password. 8. Enter your e-mail address. You will receive e-mail notification when new information is available in [address] 9. Click Sign Up. You can now view and download portions of your medical record. 10. Click the Download Summary menu link to download a portable copy of your medical information. If you have questions, please visit the Frequently Asked Questions section of the "[employer] website. Remember, "[employer] is NOT to be used for urgent needs. For medical emergencies, dial 911. Now available from your iPhone and Android! Please provide this summary of care documentation to your next provider. Your primary care clinician is listed as LORNA GUADALUPE. If you have any questions after today's visit, please call [phone].

## 2018-02-06 NOTE — PATIENT INSTRUCTIONS
Please follow up in 3 weeks. You are advised to contact us if your condition worsens. Broken Foot: Care Instructions  Your Care Instructions    A broken foot, or foot fracture, is a break in one or more of the bones in your foot. It may happen because of a sports injury, a fall, or other accident. A compound, or open, fracture occurs when a bone breaks through the skin. A break that does not poke through the skin is a closed fracture. Your treatment depends on the location and type of break in your foot. You may need a splint, a cast, or an orthopedic shoe. Certain kinds of injuries may need surgery at some time. Whatever your treatment, you can ease symptoms and help your foot heal with care at home. You may need 6 to 8 weeks or more to fully heal.  You heal best when you take good care of yourself. Eat a variety of healthy foods, and don't smoke. Follow-up care is a key part of your treatment and safety. Be sure to make and go to all appointments, and call your doctor if you are having problems. It's also a good idea to know your test results and keep a list of the medicines you take. How can you care for yourself at home? · Be safe with medicines. Take pain medicines exactly as directed. ¨ If the doctor gave you a prescription medicine for pain, take it as prescribed. ¨ If you are not taking a prescription pain medicine, ask your doctor if you can take an over-the-counter medicine. · Leave the splint on until your follow-up appointment. Do not put any weight on the injured foot. If you were given crutches, use them as directed. · Put ice or a cold pack on your foot for 10 to 20 minutes at a time. Try to do this every 1 to 2 hours for the next 3 days (when you are awake) or until the swelling goes down. Put a thin cloth between the ice and your skin. · Prop up the sore foot on a pillow anytime you sit or lie down during the next 3 days. Try to keep it above the level of your heart.  This will help reduce swelling. · Follow the cast care instructions your doctor gives you. If you have a splint, do not take it off unless your doctor tells you to. Cast and splint care  · If you have a removable splint, ask your doctor if it is okay to remove it to bathe. Your doctor may want you to keep it on as much as possible. · Keep your plaster splint covered by taping a sheet of plastic around it when you bathe. Water under the plaster can cause your skin to itch and hurt. · Never cut your splint. When should you call for help? Call 911 anytime you think you may need emergency care. For example, call if:  ? · You have chest pain, are short of breath, or you cough up blood. ?Call your doctor now or seek immediate medical care if:  ? · You have new or worse pain. ? · Your foot is cool or pale or changes color. ? · You have tingling, weakness, or numbness in your toes. ? · Your cast or splint feels too tight. ? · You have signs of a blood clot in your leg (called a deep vein thrombosis), such as:  ¨ Pain in your calf, back of the knee, thigh, or groin. ¨ Redness or swelling in your leg. ? Watch closely for changes in your health, and be sure to contact your doctor if:  ? · You have a problem with your splint or cast.   ? · You do not get better as expected. Where can you learn more? Go to http://star-sofía.info/. Enter K357 in the search box to learn more about \"Broken Foot: Care Instructions. \"  Current as of: March 21, 2017  Content Version: 11.4  © 8114-8401 [employer]. Care instructions adapted under license by [employer] (which disclaims liability or warranty for this information). If you have questions about a medical condition or this instruction, always ask your healthcare professional. [address] any warranty or liability for your use of this information.

## 2018-02-06 NOTE — PROCEDURES
X RAYS AT [address] [place] [address]  2/6/2018    RIGHT FOOT: ATTENTION RIGHT 5TH METATARSAL FX MID DIAPHYSEAL REGION    Bones and Joints: Yes Fractures:  RIGHT 5TH METATARSAL FX MID DIAPHYSEAL REGION             No subluxations, or No dislocations  Alignment:  Normal  Soft Tissues Mild swelling dorsal midfoot  Joints: Arthritis:  mild present (MIDFOOT)  Mineralization: Suggests minimal Osteopenia    I have personally reviewed the results of the above study. The interpretation of this study is my professional opinion.       Sid Tan MD  2/6/2018  3:45 PM

## 2018-02-06 NOTE — PROGRESS NOTES
AMBULATORY PROGRESS NOTE      Patient: Wu Lorenzo             MRN: [id number]     SSN: [id number] There is no height or weight on file to calculate BMI. YOB: 1965     AGE: 45 y.o. EX: female    PCP: Patricia Sim NP    IMPRESSION/DIAGNOSIS AND TREATMENT PLAN     DIAGNOSES  1. Closed nondisplaced fracture of fifth metatarsal bone of right foot with routine healing, subsequent encounter    2. Left ankle pain, unspecified chronicity    3. Closed displaced fracture of fifth metatarsal bone of right foot, initial encounter        Orders Placed This Encounter    [36876] Foot Min 3V    oxyCODONE (OXYIR) 5 mg capsule      Davida Kolb understands her diagnoses and the proposed plan. Plan:    1) Continue supplementing Multivitamin with Calcium. 2) Continue NWB to the RLE. 3) Oxycodone 5 m-2 PO BIDPRN; 30 tablets, 0 refills. RTO - 3 weeks      The patient was counseled on the dangers of tobacco use, and was advised to quit. Reviewed strategies to maximize success, including removing cigarettes and smoking materials from environment and substitution of other forms of reinforcement. I very strongly urged Wu Lorenzo to quit smoking to reduce cardiovascular risk and to promote bone healing. Wu Lorenzo understands the cardiovascular and orthopaedic risks of continued cigarette smoking. HPI AND EXAMINATION     Davida Herrera IS A 45 y.o. female who presents to my outpatient office for follow up of a closed displaced fracture of the fifth metatarsal bone of the right foot. At last visit, I provided a [facility] parking pass, prescribed Roxicodone 5 mg, and orders for a knee scooter and right short CAM walker. Patient reports that she continues to experience pain and discomfort along her RLE. She uses a CAM walker boot and a knee scooter.  She reports that she cut back on smoking tobacco. Patient continues to supplement with a Multivitamin and Calcium supplements. She has been instructed to remain NWB to the RLE in order to allow for proper healing and prevent further worsening of her fracture. Appearance: Alert, well appearing and pleasant patient who is in no distress, oriented to person, place/time, and who follows commands. Psychiatric: Affect and mood are appropriate. Cardiovascular/Peripheral Vascular: Normal Pulses to each hand and foot  Musculoskeletal:  LOCATION:  Right FOOT/ANKLE  Integumentary: No rashes, skin patches, wounds, or abrasions to the right or left legs                                                 Warm and normal color. No regions of expressible drainage.                                               Swelling to lateral Ankle mild present                                              Swelling to Medial Ankle no present      Gait: needs assistance and nonambulatory      Tenderness: ATFL/CFL Anterolateral ankle ligaments tenderness is mild present                        Anterior Syndesmosis is not present                         Medial deltoid ligament tenderness is not present                         Peroneal tendon sheath mild present                         5th Metatarsal shaft tenderness is present                          Midfoot tenderness is not present                         Achilles tenderness is not present                         Cuboid tenderness is not present                                         Proximal fibula tenderness: is not present      Motor Strength/Tone Exam: Normal to the toes with respect to extension/flexion      Sensory Exam:   Intact Normal Sensation to ankle/foot      Stability Testing: Peroneal tendon instability tests: not conducted due to tenderness                                                        Stability of ankle and subtalar region not tested due to tenderness                   ROM: Normal ROM noted to ankle                           Normal Hindfoot (Subtalar, CC, TN regions) Decreased Forefoot         Contractures: No Achilles or Gastrocnemius Contractures      Calf tenderness: Absent for calf or gastrocnemius muscle regions       Soft, supple, non tender, non taut lower extremity compartments       Alignment: Neutral Hindfoot  Wounds/Abrasions:   None present  Extremities:   No embolic phenomena to the toes or hands                                              No significant edema to the foot and or toes. Lower extremities are warm and appear well perfused                                              DVT: No evidence of DVT seen on examination at this time                                                       No calf swelling, no tenderness to calf muscles  Lymphatic:  No Evidence of Lymphedema  Vascular: Medial Border of Tibia Region: Edema is not present                             Pulses: Dorsalis Pedis &  Posterior Tibial Pulses : Palpable yes                             Varicosities Lower Limbs :  None    Neuro: Negative bilateral Straight leg raise (seated position)                         See Musculoskeletal section for pertinent individual extremity examination                         No abnormal hand/wrist, foot/ankle, or facial/neck tremors. CHART REVIEW     Past Medical History:   Diagnosis Date    Arthritis     Asthma     Cancer ([facility] [address] 2006    cervical    Cervical cancer [facility]) 2006    Chronic obstructive pulmonary disease (HCC)     Chronic pain     Depression     H/O cervical spine surgery 2013    C3-7    Headache     Ill-defined condition     cervical cancer    Psychotic disorder     Anxiety and Panic Disorders    Right thyroid nodule 3/26/2017     Current Outpatient Prescriptions   Medication Sig    oxyCODONE (OXYIR) 5 mg capsule Take 1-2 Caps by mouth two (2) times daily as needed. Max Daily Amount: 20 mg.    venlafaxine-SR (EFFEXOR-XR) 75 mg capsule Take 1 Cap by mouth daily.     albuterol (PROVENTIL HFA, VENTOLIN HFA, PROAIR HFA) 90 mcg/actuation inhaler Take 1 Puff by inhalation every four (4) hours as needed for Wheezing.  fluticasone (FLOVENT HFA) 110 mcg/actuation inhaler Take 1 Puff by inhalation every twelve (12) hours.  fluticasone (FLONASE) 50 mcg/actuation nasal spray 2 Sprays by Both Nostrils route daily. Indications: ALLERGIC RHINITIS    EPINEPHrine (EPIPEN) 0.3 mg/0.3 mL injection 0.3 mL by IntraMUSCular route once as needed for up to 1 dose.  traMADol (ULTRAM) 50 mg tablet 50 mg.    diazePAM (VALIUM) 5 mg tablet Take 1 Tab by mouth ON CALL for 1 dose. Take 30 minutes prior to MRI    gabapentin (NEURONTIN) 100 mg capsule Take 2 Caps by mouth nightly.  cyclobenzaprine (FLEXERIL) 5 mg tablet Take 1 Tab by mouth three (3) times daily as needed for Muscle Spasm(s). No current facility-administered medications for this visit.       Allergies   Allergen Reactions    Latex, Natural Rubber Rash    Latex, Natural Rubber Hives    Amoxicillin Anaphylaxis    Amoxicillin Shortness of Breath and Swelling    Aspirin Itching    Bactrim Ds [Sulfamethoxazole-Trimethoprim] Diarrhea and Nausea and Vomiting    Benadryl [Diphenhydramine Hcl] Shortness of Breath and Swelling    Codeine Diarrhea, Itching and Nausea and Vomiting    Diphenhydramine Hcl Anaphylaxis    Levaquin [Levofloxacin] Rash     Thrush    Diphenhydramine Itching    Penicillins Hives    Tylenol [Acetaminophen] Itching    Aspirin Hives    Azithromycin Other (comments)     PATIENT STATED SHE CAN TOLERATE Z-MYESHA AS OF 11/2016 OR 1/2017 WHEN SHE RECEIVED IT FOR URI    Bactrim Ds [Sulfamethoxazole-Trimethoprim] Nausea and Vomiting    Codeine Nausea and Vomiting    Gabapentin Other (comments)     Patient noted heart palpitations    Tylenol [Acetaminophen] Hives and Itching     Sneezing and slight itching, eyes water     Past Surgical History:   Procedure Laterality Date    HX HYSTERECTOMY      HX OTHER SURGICAL C3-7 SX 8 years ago    HX TONSILLECTOMY      HX WISDOM TEETH EXTRACTION      x4     Social History     Occupational History    Not on file. Social History Main Topics    Smoking status: Current Every Day Smoker     Packs/day: 1.00     Years: 31.00    Smokeless tobacco: Never Used    Alcohol use 0.0 oz/week     0 Standard drinks or equivalent per week      Comment: social    Drug use: No    Sexual activity: Yes     Family History   Problem Relation Age of Onset    Cancer Mother      colon    Elevated Lipids Mother     Thyroid Disease Mother     Allergic Rhinitis Mother     Heart Disease Father     Diabetes Father     Hypertension Father     Kidney Disease Father     No Known Problems Sister     Heart Disease Maternal Grandmother     Heart Disease Maternal Grandfather     Heart Disease Paternal Grandmother     No Known Problems Paternal Grandfather     Cancer Sister     Depression Son        REVIEW OF SYSTEMS : 2/6/2018  ALL BELOW ARE Negative except : SEE HPI       Constitutional: Negative for fever, chills and weight loss. Neg Weigh Loss  Cardiovascular: Negative for chest pain, claudication and leg swelling. SOB, CARMEN   Gastrointestinal: Negative for  pain, N/V/D/C, Blood in stool or urine,dysuria, hematuria,        Incontinence, pelvic pain  Musculoskeletal: see HPI. Neurological: Negative for dizziness and weakness. Negative for headaches,Visual Changes, Confusion, Seizures,   Psychiatric/Behavioral: Negative for depression, memory loss and substance abuse. Extremities:  Negative for  hair changes, rash or skin lesion changes. Hematologic: Negative for Bleeding problems, bruising, pallor or swollen lymph nodes.   Peripheral Vascular: No calf pain, vascular vein tenderness to calf pain              No calf throbbing, posterior knee throbbing pain    DIAGNOSTIC IMAGING      X RAYS AT [address] [place] [address]  2/6/2018    RIGHT FOOT: ATTENTION RIGHT 5TH METATARSAL FX MID DIAPHYSEAL REGION    Bones and Joints: Yes Fractures:  RIGHT 5TH METATARSAL FX MID DIAPHYSEAL REGION             No subluxations, or No dislocations  Alignment:  Normal  Soft Tissues Mild swelling dorsal midfoot  Joints: Arthritis:  mild present (MIDFOOT)  Mineralization: Suggests minimal Osteopenia    I have personally reviewed the results of the above study. The interpretation of this study is my professional opinion. Olivia Mclain MD  2/6/2018  3:45 PM             Written by Wong Tilley, as dictated by Narayan Kim MD. IDr., Narayan Kim MD, confirm that all documentation is accurate.

## 2018-02-07 DIAGNOSIS — F32.A ANXIETY AND DEPRESSION: ICD-10-CM

## 2018-02-07 DIAGNOSIS — F41.9 ANXIETY AND DEPRESSION: ICD-10-CM

## 2018-02-07 NOTE — TELEPHONE ENCOUNTER
Requested Prescriptions     Pending Prescriptions Disp Refills    venlafaxine-SR (EFFEXOR-XR) 75 mg capsule 30 Cap 3     Sig: Take 1 Cap by mouth daily.

## 2018-02-08 ENCOUNTER — TELEPHONE (OUTPATIENT)
Dept: ORTHOPEDIC SURGERY | Age: 53
End: 2018-02-08

## 2018-02-08 DIAGNOSIS — M79.671 RIGHT FOOT PAIN: Primary | ICD-10-CM

## 2018-02-08 RX ORDER — VENLAFAXINE HYDROCHLORIDE 75 MG/1
75 CAPSULE, EXTENDED RELEASE ORAL DAILY
Qty: 30 CAP | Refills: 3 | Status: SHIPPED | OUTPATIENT
Start: 2018-02-08 | End: 2018-05-14 | Stop reason: SDUPTHER

## 2018-02-08 RX ORDER — OXYCODONE AND ACETAMINOPHEN 5; 325 MG/1; MG/1
1 TABLET ORAL
Qty: 30 TAB | Refills: 0 | Status: SHIPPED | OUTPATIENT
Start: 2018-02-08 | End: 2018-05-31

## 2018-02-08 NOTE — TELEPHONE ENCOUNTER
Please check . If Rx has not been filled, then okay to provide replacement. Please see if there is anyone in the office who will sign the rx. Amara Mcdonough PA-C  2/8/2018   4:40 PM

## 2018-02-08 NOTE — TELEPHONE ENCOUNTER
Patient is calling as she was seen 02/06/2018. She lost the prescription Oxycodone 5 mg. She has looked all over not sure if she threw it out with the grocery list   She had not planned on getting it filled till this week, as she had a few left  Will be out of meds by this weekend  She was not feeling well when she left. Please advise her as to what can be done ref getting her medication.   Please call her back at [phone]

## 2018-02-09 DIAGNOSIS — S92.351A CLOSED DISPLACED FRACTURE OF FIFTH METATARSAL BONE OF RIGHT FOOT, INITIAL ENCOUNTER: ICD-10-CM

## 2018-02-09 DIAGNOSIS — S92.354D CLOSED NONDISPLACED FRACTURE OF FIFTH METATARSAL BONE OF RIGHT FOOT WITH ROUTINE HEALING, SUBSEQUENT ENCOUNTER: ICD-10-CM

## 2018-02-09 DIAGNOSIS — M25.572 LEFT ANKLE PAIN, UNSPECIFIED CHRONICITY: ICD-10-CM

## 2018-02-09 RX ORDER — OXYCODONE HYDROCHLORIDE 5 MG/1
5 CAPSULE ORAL
Qty: 30 CAP | Refills: 0 | Status: SHIPPED | OUTPATIENT
Start: 2018-02-09 | End: 2018-05-31

## 2018-02-15 NOTE — TELEPHONE ENCOUNTER
PATIENT CALLED BACK TO LET YOU KNOW SHE FOUND THE PRESCRIPTION BETWEEN THE SEATS IN HER CAR. SHE STATES SHE WILL BRING IT BACK TO THE OFFICE WHEN SHE HAS HER NEXT APPOINTMENT. SHE WILL CALL BACK TO MAKE AN APPOINTMENT BECAUSE SHE WANTED 2/23/18 BUT MAC IN NOT IN THAT DAY.

## 2018-02-27 ENCOUNTER — OFFICE VISIT (OUTPATIENT)
Dept: ORTHOPEDIC SURGERY | Age: 53
End: 2018-02-27

## 2018-02-27 VITALS
HEART RATE: 81 BPM | BODY MASS INDEX: 25.64 KG/M2 | HEIGHT: 68 IN | OXYGEN SATURATION: 98 % | TEMPERATURE: 96.9 F | WEIGHT: 169.2 LBS | DIASTOLIC BLOOD PRESSURE: 97 MMHG | SYSTOLIC BLOOD PRESSURE: 151 MMHG

## 2018-02-27 DIAGNOSIS — S92.351D CLOSED DISPLACED FRACTURE OF FIFTH METATARSAL BONE OF RIGHT FOOT WITH ROUTINE HEALING, SUBSEQUENT ENCOUNTER: ICD-10-CM

## 2018-02-27 DIAGNOSIS — S92.354D CLOSED NONDISPLACED FRACTURE OF FIFTH METATARSAL BONE OF RIGHT FOOT WITH ROUTINE HEALING, SUBSEQUENT ENCOUNTER: Primary | ICD-10-CM

## 2018-02-27 DIAGNOSIS — M79.671 RIGHT FOOT PAIN: ICD-10-CM

## 2018-02-27 RX ORDER — OXYCODONE HYDROCHLORIDE 5 MG/1
TABLET ORAL
Qty: 20 TAB | Refills: 0 | Status: SHIPPED | OUTPATIENT
Start: 2018-02-27 | End: 2018-03-27 | Stop reason: SDUPTHER

## 2018-02-27 NOTE — PROCEDURES
RADIOGRAPHIC INTERPRETATION    The impression for this/these radiograph(s) will be found in the office visit progress note for this encounter from 2/27/2018.     Marleen Tracey MD  2/27/2018  4:21 PM

## 2018-02-27 NOTE — PROGRESS NOTES
AMBULATORY PROGRESS NOTE      Patient: Ba Mckee             MRN: [id number]     SSN: [id number] Body mass index is 25.73 kg/(m^2). YOB: 1965     AGE: 45 y.o. EX: female    PCP: Nancy Kumari NP    IMPRESSION/DIAGNOSIS AND TREATMENT PLAN     DIAGNOSES  1. Closed nondisplaced fracture of fifth metatarsal bone of right foot with routine healing, subsequent encounter    2. Right foot pain    3. Closed displaced fracture of fifth metatarsal bone of right foot with routine healing, subsequent encounter        Orders Placed This Encounter    [42901] Foot Min 3V    oxyCODONE IR (ROXICODONE) 5 mg immediate release tablet      Ba Mckee understands her diagnoses and the proposed plan. I very strongly urged Ba Mckee to quit smoking to reduce cardiovascular risk and to promote bone healing. Ba Mckee understands the cardiovascular and orthopaedic risks of continued cigarette smoking. Plan:    1) Supplement Calcium and Vitamin D.  2) Oxycodone 5 m PO BIDPRN; 20 tablets, 0 refills. 3) Remain NWB to the RLE. RTO - 4 weeks    X-rays today, three views of the right foot, shows a healing fifth metatarsal fracture to the mid-diaphyseal region with some early bone formation. The fracture is out to length. It is not shortened. It is not externally rotated, not malangulated to this fifth metatarsal fracture. HPI AND EXAMINATION     Davida Herrera IS A 45 y.o. female who presents to my outpatient office for follow up of a closed displaced fracture of the fifth metatarsal bone of the right foot. At last visit, I recommended to continue supplementing Multivitamin with Calcium, recommended to continue NWB to the RLE, and prescribed Oxycodone 5 mg. Date of injury is 2018. Since we saw her last, Ms. Kvng Henry continues to experience pain along the lateral aspect of her right foot.  XR imaging has been reviewed with the patient and her  showing progressed healing of the fifth metatarsal. She has been instructed to remain PWB to the along the RLE and use the heel for balance. She has a knee scooter she uses at home. Additionally, she continues to supplement calcium and Vitamin D. Appearance: Alert, well appearing and pleasant patient who is in no distress, oriented to person, place/time, and who follows commands. Psychiatric: Affect and mood are appropriate. Cardiovascular/Peripheral Vascular: Normal Pulses to each hand and foot  Musculoskeletal:  LOCATION:  Right FOOT/ANKLE  Integumentary: No rashes, skin patches, wounds, or abrasions to the right or left legs                                                 Warm and normal color.  No regions of expressible drainage.                                              Swelling to lateral Ankle mild present                                              Swelling to Medial Ankle no present      Gait: needs assistance and nonambulatory      Tenderness: ATFL/CFL Anterolateral ankle ligaments tenderness is mild present                        Anterior Syndesmosis is not present                         Medial deltoid ligament tenderness is not present                         Peroneal tendon sheath mild present                         5th Metatarsal shaft tenderness is present                          Midfoot tenderness is not present                         Achilles tenderness is not present                         Cuboid tenderness is not present                                         Proximal fibula tenderness: is not present      Motor Strength/Tone Exam: Normal to the toes with respect to extension/flexion      Sensory Exam:   Intact Normal Sensation to ankle/foot      Stability Testing: Peroneal tendon instability tests: not conducted due to tenderness                                                        Stability of ankle and subtalar region not tested due to tenderness                   ROM: Normal ROM noted to ankle                           Normal Hindfoot (Subtalar, CC, TN regions)                             Decreased Forefoot         Contractures: No Achilles or Gastrocnemius Contractures      Calf tenderness: Absent for calf or gastrocnemius muscle regions       Soft, supple, non tender, non taut lower extremity compartments       Alignment: Neutral Hindfoot  Wounds/Abrasions:   None present  Extremities:   No embolic phenomena to the toes or hands                                              No significant edema to the foot and or toes.                                             Lower extremities are warm and appear well perfused                                              DVT: No evidence of DVT seen on examination at this time                                                       No calf swelling, no tenderness to calf muscles  Lymphatic:  No Evidence of Lymphedema  Vascular: Medial Border of Tibia Region: Edema is not present                             Pulses: Dorsalis Pedis &  Posterior Tibial Pulses : Palpable yes                             Varicosities Lower Limbs :  None    Neuro: Negative bilateral Straight leg raise (seated position)                         See Musculoskeletal section for pertinent individual extremity examination                         No abnormal hand/wrist, foot/ankle, or facial/neck tremors.     CHART REVIEW     Past Medical History:   Diagnosis Date    Arthritis     Asthma     Cancer ([facility] 2006    cervical    Cervical cancer ([facility] 2006    Chronic obstructive pulmonary disease (HCC)     Chronic pain     Depression     H/O cervical spine surgery 2013    C3-7    Headache     Ill-defined condition     cervical cancer    Psychotic disorder     Anxiety and Panic Disorders    Right thyroid nodule 3/26/2017     Current Outpatient Prescriptions   Medication Sig    oxyCODONE IR (ROXICODONE) 5 mg immediate release tablet One tablet BID    oxyCODONE (OXYIR) 5 mg capsule Take 1 Cap by mouth every six (6) hours as needed. Max Daily Amount: 20 mg.    venlafaxine-SR (EFFEXOR-XR) 75 mg capsule Take 1 Cap by mouth daily.  albuterol (PROVENTIL HFA, VENTOLIN HFA, PROAIR HFA) 90 mcg/actuation inhaler Take 1 Puff by inhalation every four (4) hours as needed for Wheezing.  fluticasone (FLOVENT HFA) 110 mcg/actuation inhaler Take 1 Puff by inhalation every twelve (12) hours.  fluticasone (FLONASE) 50 mcg/actuation nasal spray 2 Sprays by Both Nostrils route daily. Indications: ALLERGIC RHINITIS    EPINEPHrine (EPIPEN) 0.3 mg/0.3 mL injection 0.3 mL by IntraMUSCular route once as needed for up to 1 dose.  cyclobenzaprine (FLEXERIL) 5 mg tablet Take 1 Tab by mouth three (3) times daily as needed for Muscle Spasm(s).  oxyCODONE-acetaminophen (PERCOCET) 5-325 mg per tablet Take 1 Tab by mouth every six (6) hours as needed for Pain. Max Daily Amount: 4 Tabs.  traMADol (ULTRAM) 50 mg tablet 50 mg.    diazePAM (VALIUM) 5 mg tablet Take 1 Tab by mouth ON CALL for 1 dose. Take 30 minutes prior to MRI    gabapentin (NEURONTIN) 100 mg capsule Take 2 Caps by mouth nightly. No current facility-administered medications for this visit.       Allergies   Allergen Reactions    Latex, Natural Rubber Rash    Latex, Natural Rubber Hives    Amoxicillin Anaphylaxis    Amoxicillin Shortness of Breath and Swelling    Aspirin Itching    Bactrim Ds [Sulfamethoxazole-Trimethoprim] Diarrhea and Nausea and Vomiting    Benadryl [Diphenhydramine Hcl] Shortness of Breath and Swelling    Codeine Diarrhea, Itching and Nausea and Vomiting    Diphenhydramine Hcl Anaphylaxis    Levaquin [Levofloxacin] Rash     Thrush    Diphenhydramine Itching    Penicillins Hives    Tylenol [Acetaminophen] Itching    Aspirin Hives    Azithromycin Other (comments)     PATIENT STATED SHE CAN TOLERATE Z-MYESHA AS OF 11/2016 OR 1/2017 WHEN SHE RECEIVED IT FOR URI    Bactrim Ds [Sulfamethoxazole-Trimethoprim] Nausea and Vomiting    Codeine Nausea and Vomiting    Gabapentin Other (comments)     Patient noted heart palpitations    Tylenol [Acetaminophen] Hives and Itching     Sneezing and slight itching, eyes water     Past Surgical History:   Procedure Laterality Date    HX HYSTERECTOMY      HX OTHER SURGICAL      C3-7 SX 8 years ago    HX TONSILLECTOMY      HX WISDOM TEETH EXTRACTION      x4     Social History     Occupational History    Not on file. Social History Main Topics    Smoking status: Current Every Day Smoker     Packs/day: 1.00     Years: 31.00    Smokeless tobacco: Never Used    Alcohol use 0.0 oz/week     0 Standard drinks or equivalent per week      Comment: social    Drug use: No    Sexual activity: Yes     Family History   Problem Relation Age of Onset    Cancer Mother      colon    Elevated Lipids Mother     Thyroid Disease Mother     Allergic Rhinitis Mother     Heart Disease Father     Diabetes Father     Hypertension Father     Kidney Disease Father     No Known Problems Sister     Heart Disease Maternal Grandmother     Heart Disease Maternal Grandfather     Heart Disease Paternal Grandmother     No Known Problems Paternal Grandfather     Cancer Sister     Depression Son        REVIEW OF SYSTEMS : 2/27/2018  ALL BELOW ARE Negative except : SEE HPI       Constitutional: Negative for fever, chills and weight loss. Neg Weigh Loss  Cardiovascular: Negative for chest pain, claudication and leg swelling. SOB, CARMEN   Gastrointestinal: Negative for  pain, N/V/D/C, Blood in stool or urine,dysuria, hematuria,        Incontinence, pelvic pain  Musculoskeletal: see HPI. Neurological: Negative for dizziness and weakness. Negative for headaches,Visual Changes, Confusion, Seizures,   Psychiatric/Behavioral: Negative for depression, memory loss and substance abuse.    Extremities:  Negative for  hair changes, rash or skin lesion changes. Hematologic: Negative for Bleeding problems, bruising, pallor or swollen lymph nodes. Peripheral Vascular: No calf pain, vascular vein tenderness to calf pain              No calf throbbing, posterior knee throbbing pain    DIAGNOSTIC IMAGING     RADIOGRAPHIC INTERPRETATION    The impression for this/these radiograph(s) will be found in the office visit progress note for this encounter from 2/27/2018. Vanessa Recio MD  2/27/2018  4:21 PM          Written by Romayne Se, as dictated by Ирина Farfan MD. IDr., Ирина Farfan MD, confirm that all documentation is accurate.

## 2018-02-27 NOTE — MR AVS SNAPSHOT
[address] [place] [address], [address] [facility] 
[phone] Patient: Dwight Abraham MRN: [id number] :1965 Visit Information Date & Time Provider Department Dept. Phone Encounter #  
 2018  3:15 PM Anthony Ball, [facility] [id number] [phone] Follow-up Instructions Return in about 3 weeks (around 3/20/2018). Follow-up and Disposition History Upcoming Health Maintenance Date Due COLONOSCOPY 1981 BREAST CANCER SCRN MAMMOGRAM 2013 PAP AKA CERVICAL CYTOLOGY 2020 DTaP/Tdap/Td series (2 - Td) 2025 Allergies as of 2018  Review Complete On: 2018 By: Anthony Ball MD  
  
 Severity Noted Reaction Type Reactions Latex, Natural Rubber Medium 2017    Rash Latex, Natural Rubber  2015    Hives Amoxicillin High 2015    Anaphylaxis Amoxicillin High 2017    Shortness of Breath, Swelling Aspirin High 2017    Itching Bactrim Ds [Sulfamethoxazole-trimethoprim] High 2017    Diarrhea, Nausea and Vomiting Benadryl [Diphenhydramine Hcl] High 2017    Shortness of Breath, Swelling Codeine High 2017    Diarrhea, Itching, Nausea and Vomiting Diphenhydramine Hcl High 2015    Anaphylaxis Levaquin [Levofloxacin] High 2017    Rash Aundra Bicker Diphenhydramine Medium 2017    Itching Penicillins Medium 2013    Hives Tylenol [Acetaminophen] Medium 2017    Itching Aspirin  2015    Hives Azithromycin  2015    Other (comments) PATIENT STATED SHE CAN TOLERATE Z-MYESHA AS OF 2016 OR 2017 WHEN SHE RECEIVED IT FOR URI Bactrim Ds [Sulfamethoxazole-trimethoprim]  2015    Nausea and Vomiting Codeine  2015    Nausea and Vomiting Gabapentin  2017    Other (comments) Patient noted heart palpitations Tylenol [Acetaminophen]  08/05/2015    Hives, Itching Sneezing and slight itching, eyes water Current Immunizations  Reviewed on 11/21/2017 Name Date Influenza Vaccine (Quad) PF 11/21/2017 Pneumococcal Polysaccharide (PPSV-23) 11/12/2011 12:00 AM  
 Tdap 8/5/2015 11:55 PM  
  
 Not reviewed this visit You Were Diagnosed With   
  
 Codes Comments Closed nondisplaced fracture of fifth metatarsal bone of right foot with routine healing, subsequent encounter    -  Primary ICD-10-CM: V79.788D ICD-9-CM: V54.19 Right foot pain     ICD-10-CM: M79.671 ICD-9-CM: 729.5 Closed displaced fracture of fifth metatarsal bone of right foot with routine healing, subsequent encounter     ICD-10-CM: S92.351D ICD-9-CM: V54.19 Vitals BP Pulse Temp Height(growth percentile) Weight(growth percentile) SpO2  
 (!) 151/97 81 96.9 °F (36.1 °C) 5' 8\" (1.727 m) 169 lb 3.2 oz (76.7 kg) 98% BMI OB Status Smoking Status 25.73 kg/m2 Hysterectomy Current Every Day Smoker BMI and BSA Data Body Mass Index Body Surface Area 25.73 kg/m 2 1.92 m 2 Preferred Pharmacy Pharmacy Name [facility]note 3[facility] - [address] [place] [address] [phone] Your Updated Medication List  
  
   
This list is accurate as of 2/27/18  4:25 PM.  Always use your most recent med list.  
  
  
  
  
 albuterol 90 mcg/actuation inhaler Commonly known as:  PROVENTIL HFA, VENTOLIN HFA, PROAIR HFA Take 1 Puff by inhalation every four (4) hours as needed for Wheezing. cyclobenzaprine 5 mg tablet Commonly known as:  FLEXERIL Take 1 Tab by mouth three (3) times daily as needed for Muscle Spasm(s). diazePAM 5 mg tablet Commonly known as:  VALIUM Take 1 Tab by mouth ON CALL for 1 dose. Take 30 minutes prior to MRI EPINEPHrine 0.3 mg/0.3 mL injection Commonly known as:  Jessica Cruz  
 0.3 mL by IntraMUSCular route once as needed for up to 1 dose. * fluticasone 110 mcg/actuation inhaler Commonly known as:  FLOVENT HFA Take 1 Puff by inhalation every twelve (12) hours. * fluticasone 50 mcg/actuation nasal spray Commonly known as:  Lorry Sinning 2 Sprays by Both Nostrils route daily. Indications: ALLERGIC RHINITIS  
  
 gabapentin 100 mg capsule Commonly known as:  NEURONTIN Take 2 Caps by mouth nightly. * oxyCODONE 5 mg capsule Commonly known as:  OXYIR Take 1 Cap by mouth every six (6) hours as needed. Max Daily Amount: 20 mg.  
  
 * oxyCODONE IR 5 mg immediate release tablet Commonly known as:  Mckayla Boatman One tablet BID  
  
 oxyCODONE-acetaminophen 5-325 mg per tablet Commonly known as:  PERCOCET Take 1 Tab by mouth every six (6) hours as needed for Pain. Max Daily Amount: 4 Tabs. traMADol 50 mg tablet Commonly known as:  ULTRAM  
50 mg.  
  
 venlafaxine-SR 75 mg capsule Commonly known as:  EFFEXOR-XR Take 1 Cap by mouth daily. * Notice: This list has 4 medication(s) that are the same as other medications prescribed for you. Read the directions carefully, and ask your doctor or other care provider to review them with you. Prescriptions Printed Refills  
 oxyCODONE IR (ROXICODONE) 5 mg immediate release tablet 0 Sig: One tablet BID Class: Print We Performed the Following AMB POC XRAY, FOOT; COMPLETE, 3+ VIEW [63520 CPT(R)] Follow-up Instructions Return in about 3 weeks (around 3/20/2018). Patient Instructions Please follow up in 4 weeks. You are advised to contact us if your condition worsens. Fifth Metatarsal Khalil Fracture: Care Instructions Your Care Instructions A fifth metatarsal fracture is a break or a thin, hairline crack in the long bone on the outside of the foot. A Khalil fracture occurs near the end of this bone that is closest to the ankle. This type of fracture can happen when a person jumps or changes direction quickly and twists the foot or ankle the wrong way. Or it can happen from repeated stress on the bones of the foot. Treatment depends on how bad the fracture is. You may or may not have had surgery. Your doctor may have put your foot in a cast to keep it stable. A splint may be used in some cases if there is a lot of swelling. You may have been given crutches to use to keep weight off your foot. Your doctor may recommend that you keep weight off the foot for several weeks. The fracture may take 6 weeks to several months to heal. It is important to give your foot time to heal completely so that you don't hurt it again. Do not return to your usual activities until your doctor says you can. Your doctor may suggest that you get physical therapy to help regain strength and range of motion in your foot. You heal best when you take good care of yourself. Eat a variety of healthy foods, and don't smoke. Follow-up care is a key part of your treatment and safety. Be sure to make and go to all appointments, and call your doctor if you are having problems. It's also a good idea to know your test results and keep a list of the medicines you take. How can you care for yourself at home? · Be safe with medicines. Read and follow all instructions on the label. ¨ If the doctor gave you a prescription medicine for pain, take it as prescribed. ¨ If you are not taking a prescription pain medicine, ask your doctor if you can take an over-the-counter medicine. · Follow your doctor's instructions about how much weight you can put on your foot and when you can go back to your usual activities. If you were given crutches, use them as directed. · Put ice or a cold pack on your foot for 10 to 20 minutes at a time. Try to do this every 1 to 2 hours for the next 3 days (when you are awake) or until the swelling goes down.  Put a thin cloth between the ice and your skin. 
· Prop up your foot on a pillow when you ice it or anytime you sit or lie down for the next 3 days. Try to keep it above the level of your heart. This will help reduce swelling. Cast and splint care · If your foot is in a cast or splint, follow the cast or splint care instructions your doctor gives you. If you have a removable fiberglass walking cast or a splint, do not take it off unless your doctor tells you to. · Keep your cast or splint dry. If you have a removable fiberglass walking cast or a splint, ask your doctor if it is okay to remove it to bathe. Your doctor may want you to keep it on as much as possible. · If you are told to keep your cast or splint on, tape a sheet of plastic to cover it when you bathe. Water under the cast or splint can cause your skin to itch and hurt. · Never cut your cast or stick anything down inside it to scratch an itch on your leg. When should you call for help? Call 911 anytime you think you may need emergency care. For example, call if: 
? · You have symptoms of a blood clot in your lung (called a pulmonaryembolism). These may include: 
¨ Sudden chest pain. ¨ Trouble breathing. ¨ Coughing up blood. ? · You passed out (lost consciousness). ?Call your doctor now or seek immediate medical care if: 
? · You have problems with your cast or splint. For example: ¨ The skin under the cast or splint is burning or stinging. ¨ The cast or splint feels too tight. ¨ There is a lot of swelling near the cast or splint. (Some swelling is normal.) ¨ You have a new fever. ¨ There is drainage or a bad smell coming from the cast or splint. ? · You have increased or severe pain. ? · You have tingling, weakness, or numbness in your foot and toes. ? · You cannot move your toes. ? · Your foot turns cold or changes color. ? Watch closely for changes in your health, and be sure to contact your doctor if: 
? · The pain does not get better day by day. ? · You do not get better as expected. Where can you learn more? Go to http://star-sofía.info/. Enter K861 in the search box to learn more about \"Fifth Metatarsal Khalil Fracture: Care Instructions. \" Current as of: March 21, 2017 Content Version: 11.4 © 1592-9080 [employer]. Care instructions adapted under license by [employer] (which disclaims liability or warranty for this information). If you have questions about a medical condition or this instruction, always ask your healthcare professional. [address] any warranty or liability for your use of this information. Introducing [facility]! [facility] introduces [employer]antage patient portal. Now you can access parts of your medical record, email your doctor's office, and request medication refills online. 1. In your internet browser, go to [email or web address] 2. Click on the First Time User? Click Here link in the Sign In box. You will see the New Member Sign Up page. 3. Enter your LifeVantage Access Code exactly as it appears below. You will not need to use this code after youve completed the sign-up process. If you do not sign up before the expiration date, you must request a new code. · LifeVantage Access Code: [email or web address] Expires: 4/13/2018 10:29 AM 
 
4. Enter the last four digits of your Social Security Number (xxxx) and Date of Birth (mm/dd/yyyy) as indicated and click Submit. You will be taken to the next sign-up page. 5. Create a Hello Markett ID. This will be your LifeVantage login ID and cannot be changed, so think of one that is secure and easy to remember. 6. Create a LifeVantage password. You can change your password at any time. 7. Enter your Password Reset Question and Answer. This can be used at a later time if you forget your password. 8. Enter your e-mail address.  You will receive e-mail notification when new information is available in [employer]. 9. Click Sign Up. You can now view and download portions of your medical record. 10. Click the Download Summary menu link to download a portable copy of your medical information. If you have questions, please visit the Frequently Asked Questions section of the [employer] website. Remember, [employer] is NOT to be used for urgent needs. For medical emergencies, dial 911. Now available from your iPhone and Android! Please provide this summary of care documentation to your next provider. Your primary care clinician is listed as LORNA GUADALUPE. If you have any questions after today's visit, please call [phone].

## 2018-02-27 NOTE — PATIENT INSTRUCTIONS
Please follow up in 4 weeks. You are advised to contact us if your condition worsens. Fifth Metatarsal Khalil Fracture: Care Instructions  Your Care Instructions    A fifth metatarsal fracture is a break or a thin, hairline crack in the long bone on the outside of the foot. A Khalil fracture occurs near the end of this bone that is closest to the ankle. This type of fracture can happen when a person jumps or changes direction quickly and twists the foot or ankle the wrong way. Or it can happen from repeated stress on the bones of the foot. Treatment depends on how bad the fracture is. You may or may not have had surgery. Your doctor may have put your foot in a cast to keep it stable. A splint may be used in some cases if there is a lot of swelling. You may have been given crutches to use to keep weight off your foot. Your doctor may recommend that you keep weight off the foot for several weeks. The fracture may take 6 weeks to several months to heal. It is important to give your foot time to heal completely so that you don't hurt it again. Do not return to your usual activities until your doctor says you can. Your doctor may suggest that you get physical therapy to help regain strength and range of motion in your foot. You heal best when you take good care of yourself. Eat a variety of healthy foods, and don't smoke. Follow-up care is a key part of your treatment and safety. Be sure to make and go to all appointments, and call your doctor if you are having problems. It's also a good idea to know your test results and keep a list of the medicines you take. How can you care for yourself at home? · Be safe with medicines. Read and follow all instructions on the label. ¨ If the doctor gave you a prescription medicine for pain, take it as prescribed. ¨ If you are not taking a prescription pain medicine, ask your doctor if you can take an over-the-counter medicine.   · Follow your doctor's instructions about how much weight you can put on your foot and when you can go back to your usual activities. If you were given crutches, use them as directed. · Put ice or a cold pack on your foot for 10 to 20 minutes at a time. Try to do this every 1 to 2 hours for the next 3 days (when you are awake) or until the swelling goes down. Put a thin cloth between the ice and your skin. · Prop up your foot on a pillow when you ice it or anytime you sit or lie down for the next 3 days. Try to keep it above the level of your heart. This will help reduce swelling. Cast and splint care  · If your foot is in a cast or splint, follow the cast or splint care instructions your doctor gives you. If you have a removable fiberglass walking cast or a splint, do not take it off unless your doctor tells you to. · Keep your cast or splint dry. If you have a removable fiberglass walking cast or a splint, ask your doctor if it is okay to remove it to bathe. Your doctor may want you to keep it on as much as possible. · If you are told to keep your cast or splint on, tape a sheet of plastic to cover it when you bathe. Water under the cast or splint can cause your skin to itch and hurt. · Never cut your cast or stick anything down inside it to scratch an itch on your leg. When should you call for help? Call 911 anytime you think you may need emergency care. For example, call if:  ? · You have symptoms of a blood clot in your lung (called a pulmonaryembolism). These may include:  ¨ Sudden chest pain. ¨ Trouble breathing. ¨ Coughing up blood. ? · You passed out (lost consciousness). ?Call your doctor now or seek immediate medical care if:  ? · You have problems with your cast or splint. For example:  ¨ The skin under the cast or splint is burning or stinging. ¨ The cast or splint feels too tight. ¨ There is a lot of swelling near the cast or splint. (Some swelling is normal.)  ¨ You have a new fever.   ¨ There is drainage or a bad smell coming from the cast or splint. ? · You have increased or severe pain. ? · You have tingling, weakness, or numbness in your foot and toes. ? · You cannot move your toes. ? · Your foot turns cold or changes color. ? Watch closely for changes in your health, and be sure to contact your doctor if:  ? · The pain does not get better day by day. ? · You do not get better as expected. Where can you learn more? Go to http://star-sofía.info/. Enter O402 in the search box to learn more about \"Fifth Metatarsal Khalil Fracture: Care Instructions. \"  Current as of: March 21, 2017  Content Version: 11.4  © 8037-6415 [employer]. Care instructions adapted under license by [employer] (which disclaims liability or warranty for this information). If you have questions about a medical condition or this instruction, always ask your healthcare professional. [address] any warranty or liability for your use of this information.

## 2018-03-27 ENCOUNTER — OFFICE VISIT (OUTPATIENT)
Dept: ORTHOPEDIC SURGERY | Age: 53
End: 2018-03-27

## 2018-03-27 VITALS
HEART RATE: 90 BPM | DIASTOLIC BLOOD PRESSURE: 97 MMHG | HEIGHT: 68 IN | TEMPERATURE: 98.4 F | RESPIRATION RATE: 16 BRPM | WEIGHT: 168 LBS | OXYGEN SATURATION: 97 % | SYSTOLIC BLOOD PRESSURE: 159 MMHG | BODY MASS INDEX: 25.46 KG/M2

## 2018-03-27 DIAGNOSIS — S92.354D CLOSED NONDISPLACED FRACTURE OF FIFTH METATARSAL BONE OF RIGHT FOOT WITH ROUTINE HEALING, SUBSEQUENT ENCOUNTER: Primary | ICD-10-CM

## 2018-03-27 DIAGNOSIS — S92.351D CLOSED DISPLACED FRACTURE OF FIFTH METATARSAL BONE OF RIGHT FOOT WITH ROUTINE HEALING, SUBSEQUENT ENCOUNTER: ICD-10-CM

## 2018-03-27 RX ORDER — OXYCODONE HYDROCHLORIDE 5 MG/1
TABLET ORAL
Qty: 20 TAB | Refills: 0 | Status: SHIPPED | OUTPATIENT
Start: 2018-03-27 | End: 2018-05-31

## 2018-03-27 NOTE — PATIENT INSTRUCTIONS
Please follow up in 4 weeks. You are advised to contact us if your condition worsens. Fifth Metatarsal Fracture: Rehab Exercises  Your Care Instructions  Here are some examples of typical rehabilitation exercises for your condition. Start each exercise slowly. Ease off the exercise if you start to have pain. Your doctor or physical therapist will tell you when you can start these exercises and which ones will work best for you. How to do the exercises  Calf wall stretch (back knee straight)    1. Stand facing a wall with your hands on the wall at about eye level. Put your affected foot about a step behind your other foot. 2. Keeping your back leg straight and your back heel on the floor, bend your front knee and gently bring your hip and chest toward the wall until you feel a stretch in the calf of your back leg. 3. Hold the stretch for at least 15 to 30 seconds. 4. Repeat 2 to 4 times. Calf wall stretch (knees bent)    1. Stand facing a wall with your hands on the wall at about eye level. Put your affected foot about a step behind your other foot. 2. Keeping both heels on the floor, bend both knees. Then gently bring your hip and chest toward the wall until you feel a stretch in the calf of your back leg. 3. Hold the stretch for at least 15 to 30 seconds. 4. Repeat 2 to 4 times. [place] pick-ups    1. Put some marbles on the floor next to a cup.  2. Sit in a chair, and use the toes of your affected foot to lift up one marble from the floor at a time. Then try to put the marble in the cup.  3. Repeat 8 to 12 times. Towel scrunches    1. Sit in a chair, and place both feet on a towel on the floor. 2. Scrunch the towel toward you with your toes. Then use your toes to push the towel back into place. 3. Repeat 8 to 12 times. Towel inversion and eversion    1. Sit in a chair, and place both feet on a towel on the floor. 2. Swivel your feet from side to side to slide the towel.  First slide your toes, then your heels, as you move the towel with your feet. Then change directions and swivel your feet from side to side to slide the towel back to the starting position. 3. Repeat 8 to 12 times. Resisted ankle inversion    1. Sit on the floor with your good foot crossed over your affected foot. 2. Hold both ends of an exercise band, and loop the band around the inside of your affected foot. Then press your other foot against the band. 3. Keeping your feet crossed, slowly push your affected foot against the band so that foot moves away from your other foot. Then slowly relax. 4. Repeat 8 to 12 times. Resisted ankle eversion    1. Sit on the floor with your legs straight. 2. Hold both ends of an exercise band, and loop the band around the outside of your affected foot. Then press your other foot against the band. 3. Keeping your leg straight, slowly push your affected foot outward against the band and away from your other foot without letting your leg rotate. Then slowly relax. 4. Repeat 8 to 12 times. Follow-up care is a key part of your treatment and safety. Be sure to make and go to all appointments, and call your doctor if you are having problems. It's also a good idea to know your test results and keep a list of the medicines you take. Where can you learn more? Go to http://star-sofía.info/. Enter 488 2475 in the search box to learn more about \"Fifth Metatarsal Fracture: Rehab Exercises. \"  Current as of: March 21, 2017  Content Version: 11.4  © 0308-4395 [employer]. Care instructions adapted under license by [employer] (which disclaims liability or warranty for this information). If you have questions about a medical condition or this instruction, always ask your healthcare professional. [address] any warranty or liability for your use of this information.

## 2018-03-27 NOTE — MR AVS SNAPSHOT
[address] [place] [address], [address] [place] [address] 
[phone] Patient: Kaelyn Charles MRN: [id number] :1965 Visit Information Date & Time Provider Department Dept. Phone Encounter #  
 3/27/2018  2:10 PM Iron Horton, [address] [place] [address] [facility] [id number] Upcoming Health Maintenance Date Due COLONOSCOPY 1981 BREAST CANCER SCRN MAMMOGRAM 2013 MEDICARE YEARLY EXAM 3/14/2018 PAP AKA CERVICAL CYTOLOGY 2020 DTaP/Tdap/Td series (2 - Td) 2025 Allergies as of 3/27/2018  Review Complete On: 3/27/2018 By: Iron Horton MD  
  
 Severity Noted Reaction Type Reactions Latex, Natural Rubber Medium 2017    Rash Latex, Natural Rubber  2015    Hives Amoxicillin High 2015    Anaphylaxis Amoxicillin High 2017    Shortness of Breath, Swelling Aspirin High 2017    Itching Bactrim Ds [Sulfamethoxazole-trimethoprim] High 2017    Diarrhea, Nausea and Vomiting Benadryl [Diphenhydramine Hcl] High 2017    Shortness of Breath, Swelling Codeine High 2017    Diarrhea, Itching, Nausea and Vomiting Diphenhydramine Hcl High 2015    Anaphylaxis Levaquin [Levofloxacin] High 2017    Rash [place] Diphenhydramine Medium 2017    Itching Penicillins Medium 2013    Hives Tylenol [Acetaminophen] Medium 2017    Itching Aspirin  2015    Hives Azithromycin  2015    Other (comments) PATIENT STATED SHE CAN TOLERATE Z-MYESHA AS OF 2016 OR 2017 WHEN SHE RECEIVED IT FOR URI Bactrim Ds [Sulfamethoxazole-trimethoprim]  2015    Nausea and Vomiting Codeine  2015    Nausea and Vomiting Gabapentin  2017    Other (comments) Patient noted heart palpitations Tylenol [Acetaminophen]  2015    Hives, Itching Sneezing and slight itching, eyes water Current Immunizations  Reviewed on 11/21/2017 Name Date Influenza Vaccine (Quad) PF 11/21/2017 Pneumococcal Polysaccharide (PPSV-23) 11/12/2011 12:00 AM  
 Tdap 8/5/2015 11:55 PM  
  
 Not reviewed this visit You Were Diagnosed With   
  
 Codes Comments Closed nondisplaced fracture of fifth metatarsal bone of right foot with routine healing, subsequent encounter    -  Primary ICD-10-CM: I28.069A ICD-9-CM: V54.19 Closed displaced fracture of fifth metatarsal bone of right foot with routine healing, subsequent encounter     ICD-10-CM: S92.351D ICD-9-CM: V54.19 Vitals BP Pulse Temp Resp Height(growth percentile) Weight(growth percentile) (!) 159/97 90 98.4 °F (36.9 °C) 16 5' 8\" (1.727 m) 168 lb (76.2 kg) SpO2 BMI OB Status Smoking Status 97% 25.54 kg/m2 Hysterectomy Current Every Day Smoker Vitals History BMI and BSA Data Body Mass Index Body Surface Area 25.54 kg/m 2 1.91 m 2 Preferred Pharmacy Pharmacy Name [facility] [address] [place] [address],[address] [place] [address] [phone] Your Updated Medication List  
  
   
This list is accurate as of 3/27/18  2:33 PM.  Always use your most recent med list.  
  
  
  
  
 albuterol 90 mcg/actuation inhaler Commonly known as:  PROVENTIL HFA, VENTOLIN HFA, PROAIR HFA Take 1 Puff by inhalation every four (4) hours as needed for Wheezing. cyclobenzaprine 5 mg tablet Commonly known as:  FLEXERIL Take 1 Tab by mouth three (3) times daily as needed for Muscle Spasm(s). diazePAM 5 mg tablet Commonly known as:  VALIUM Take 1 Tab by mouth ON CALL for 1 dose. Take 30 minutes prior to MRI EPINEPHrine 0.3 mg/0.3 mL injection Commonly known as:  EPIPEN  
0.3 mL by IntraMUSCular route once as needed for up to 1 dose. * fluticasone 110 mcg/actuation inhaler Commonly known as:  FLOVENT HFA  
 Take 1 Puff by inhalation every twelve (12) hours. * fluticasone 50 mcg/actuation nasal spray Commonly known as:  Celestia Chelsie 2 Sprays by Both Nostrils route daily. Indications: ALLERGIC RHINITIS  
  
 gabapentin 100 mg capsule Commonly known as:  NEURONTIN Take 2 Caps by mouth nightly. * oxyCODONE 5 mg capsule Commonly known as:  OXYIR Take 1 Cap by mouth every six (6) hours as needed. Max Daily Amount: 20 mg.  
  
 * oxyCODONE IR 5 mg immediate release tablet Commonly known as:  Shellie Mckeon One tablet BID  
  
 oxyCODONE-acetaminophen 5-325 mg per tablet Commonly known as:  PERCOCET Take 1 Tab by mouth every six (6) hours as needed for Pain. Max Daily Amount: 4 Tabs. traMADol 50 mg tablet Commonly known as:  ULTRAM  
50 mg.  
  
 venlafaxine-SR 75 mg capsule Commonly known as:  EFFEXOR-XR Take 1 Cap by mouth daily. * Notice: This list has 4 medication(s) that are the same as other medications prescribed for you. Read the directions carefully, and ask your doctor or other care provider to review them with you. Prescriptions Printed Refills  
 oxyCODONE IR (ROXICODONE) 5 mg immediate release tablet 0 Sig: One tablet BID Class: Print We Performed the Following AMB POC XRAY, FOOT; COMPLETE, 3+ VIEW [54117 CPT(R)] Patient Instructions Please follow up in 4 weeks. You are advised to contact us if your condition worsens. Fifth Metatarsal Fracture: Rehab Exercises Your Care Instructions Here are some examples of typical rehabilitation exercises for your condition. Start each exercise slowly. Ease off the exercise if you start to have pain. Your doctor or physical therapist will tell you when you can start these exercises and which ones will work best for you. How to do the exercises Calf wall stretch (back knee straight) 1. Stand facing a wall with your hands on the wall at about eye level.  Put your affected foot about a step behind your other foot. 2. Keeping your back leg straight and your back heel on the floor, bend your front knee and gently bring your hip and chest toward the wall until you feel a stretch in the calf of your back leg. 3. Hold the stretch for at least 15 to 30 seconds. 4. Repeat 2 to 4 times. Calf wall stretch (knees bent) 1. Stand facing a wall with your hands on the wall at about eye level. Put your affected foot about a step behind your other foot. 2. Keeping both heels on the floor, bend both knees. Then gently bring your hip and chest toward the wall until you feel a stretch in the calf of your back leg. 3. Hold the stretch for at least 15 to 30 seconds. 4. Repeat 2 to 4 times. [place] pick-ups 1. Put some marbles on the floor next to a cup. 
2. Sit in a chair, and use the toes of your affected foot to lift up one marble from the floor at a time. Then try to put the marble in the cup. 
3. Repeat 8 to 12 times. Towel scrunches 1. Sit in a chair, and place both feet on a towel on the floor. 2. Scrunch the towel toward you with your toes. Then use your toes to push the towel back into place. 3. Repeat 8 to 12 times. Towel inversion and eversion 1. Sit in a chair, and place both feet on a towel on the floor. 2. Swivel your feet from side to side to slide the towel. First slide your toes, then your heels, as you move the towel with your feet. Then change directions and swivel your feet from side to side to slide the towel back to the starting position. 3. Repeat 8 to 12 times. Resisted ankle inversion 1. Sit on the floor with your good foot crossed over your affected foot. 2. Hold both ends of an exercise band, and loop the band around the inside of your affected foot. Then press your other foot against the band. 3. Keeping your feet crossed, slowly push your affected foot against the band so that foot moves away from your other foot. Then slowly relax. 4. Repeat 8 to 12 times. Resisted ankle eversion 1. Sit on the floor with your legs straight. 2. Hold both ends of an exercise band, and loop the band around the outside of your affected foot. Then press your other foot against the band. 3. Keeping your leg straight, slowly push your affected foot outward against the band and away from your other foot without letting your leg rotate. Then slowly relax. 4. Repeat 8 to 12 times. Follow-up care is a key part of your treatment and safety. Be sure to make and go to all appointments, and call your doctor if you are having problems. It's also a good idea to know your test results and keep a list of the medicines you take. Where can you learn more? Go to http://star-sofía.info/. Enter 268 [id number] in the search box to learn more about \"Fifth Metatarsal Fracture: Rehab Exercises. \" Current as of: March 21, 2017 Content Version: 11.4 © 6454-2046 [employer]. Care instructions adapted under license by [employer] (which disclaims liability or warranty for this information). If you have questions about a medical condition or this instruction, always ask your healthcare professional. [place] [address] any warranty or liability for your use of this information. Introducing [facility]! Sunil Charles introduces Dinnr patient portal. Now you can access parts of your medical record, email your doctor's office, and request medication refills online. 1. In your internet browser, go to [email or web address] 2. Click on the First Time User? Click Here link in the Sign In box. You will see the New Member Sign Up page. 3. Enter your Dinnr Access Code exactly as it appears below. You will not need to use this code after youve completed the sign-up process. If you do not sign up before the expiration date, you must request a new code. · Dinnr Access Code: [email or web address] Expires: 4/13/2018 11:29 AM 
 
4. Enter the last four digits of your Social Security Number (xxxx) and Date of Birth (mm/dd/yyyy) as indicated and click Submit. You will be taken to the next sign-up page. 5. Create a ChannelMeter ID. This will be your ChannelMeter login ID and cannot be changed, so think of one that is secure and easy to remember. 6. Create a ChannelMeter password. You can change your password at any time. 7. Enter your Password Reset Question and Answer. This can be used at a later time if you forget your password. 8. Enter your e-mail address. You will receive e-mail notification when new information is available in [address] 9. Click Sign Up. You can now view and download portions of your medical record. 10. Click the Download Summary menu link to download a portable copy of your medical information. If you have questions, please visit the Frequently Asked Questions section of the ChannelMeter website. Remember, ChannelMeter is NOT to be used for urgent needs. For medical emergencies, dial 911. Now available from your iPhone and Android! Please provide this summary of care documentation to your next provider. Your primary care clinician is listed as LORNA GUADALUPE. If you have any questions after today's visit, please call [phone].

## 2018-03-27 NOTE — PROGRESS NOTES
AMBULATORY PROGRESS NOTE      Patient: Kaelyn Charles             MRN: [id number]     SSN: [id number] Body mass index is 25.54 kg/(m^2). YOB: 1965     AGE: 45 y.o. EX: female    PCP: Reagan Phillips NP    IMPRESSION/DIAGNOSIS AND TREATMENT PLAN     DIAGNOSES  1. Closed nondisplaced fracture of fifth metatarsal bone of right foot with routine healing, subsequent encounter    2. Closed displaced fracture of fifth metatarsal bone of right foot with routine healing, subsequent encounter        Orders Placed This Encounter    [34071] Foot Min 3V    oxyCODONE IR (ROXICODONE) 5 mg immediate release tablet      Kaelyn Charles understands her diagnoses and the proposed plan. I very strongly urged Kaelyn Charles to quit smoking to reduce cardiovascular risk and to promote bone healing. Kaelyn Charles understands the cardiovascular and orthopaedic risks of continued cigarette smoking.      Plan:    1) Remain PWB as directed. 2) Continue activity modification as directed. 3) Oxycodone 5 m PO BIDPRN; 20 tablets, 0 refills. RTO - 4 weeks / PLEASE OBTAIN X-RAYS OF: right foot 3 VIEWS    HPI AND EXAMINATION     Davida Herrera IS A 45 y.o. female who presents to my outpatient office for follow up of a closed displaced fracture of the fifth metatarsal bone of the right foot. At last visit, I recommended to supplement Calcium and Vitamin D, prescribed Oxycodone 5 mg, and instructed to remain NWB to the RLE. She was instructed to be non-weightbearing; however, she arrives here fully weightbearing in her CAM walker boot, with her cane. She states she had fallen last week walking her puppy in the back of her yard. This happened last week. She complains of pain to the lateral part of her foot, at her previous fracture site, her right fifth metatarsal middle one-third region, which she points to specifically, as the region of discomfort.      Ms. Castillo Nurse reports that since we saw her last she fell again after stepping into a hole that her dog formed on her yard. She notes twisting her ankle while wearing her CAM walker boot. She continues to experience pain and discomfort along the lateral aspect of her right foot. She has been instructed to continue bearing weight on her heel to ambulate. XR imaging has been reviewed with the patient. Appearance: Alert, well appearing and pleasant patient who is in no distress, oriented to person, place/time, and who follows commands. Psychiatric: Affect and mood are appropriate. Cardiovascular/Peripheral Vascular: Normal Pulses to each hand and foot  Musculoskeletal:  LOCATION:  Right FOOT/ANKLE  Integumentary: No rashes, skin patches, wounds, or abrasions to the right or left legs                                                 Warm and normal color.  No regions of expressible drainage.                                              Swelling to lateral Ankle mild present                                              Swelling to Medial Ankle no present      Gait: needs assistance and nonambulatory      Tenderness: ATFL/CFL Anterolateral ankle ligaments tenderness is mild present                        Anterior Syndesmosis is not present                         Medial deltoid ligament tenderness is not present                         Peroneal tendon sheath mild present                         5th Metatarsal shaft tenderness is present                          Midfoot tenderness is not present                         Achilles tenderness is not present                         Cuboid tenderness is not present                                         Proximal fibula tenderness: is not present      Motor Strength/Tone Exam: Normal to the toes with respect to extension/flexion      Sensory Exam:   Intact Normal Sensation to ankle/foot      Stability Testing: Peroneal tendon instability tests: not conducted due to tenderness                                                        Stability of ankle and subtalar region not tested due to tenderness                   ROM: Normal ROM noted to ankle                           Normal Hindfoot (Subtalar, CC, TN regions)                             Decreased Forefoot         Contractures: No Achilles or Gastrocnemius Contractures      Calf tenderness: Absent for calf or gastrocnemius muscle regions       Soft, supple, non tender, non taut lower extremity compartments       Alignment: Neutral Hindfoot  Wounds/Abrasions:   None present  Extremities:   No embolic phenomena to the toes or hands                                              No significant edema to the foot and or toes.                                             Lower extremities are warm and appear well perfused                                              DVT: No evidence of DVT seen on examination at this time                                                       No calf swelling, no tenderness to calf muscles  Lymphatic:  No Evidence of Lymphedema  Vascular: Medial Border of Tibia Region: Edema is not present                             Pulses: Dorsalis Pedis &  Posterior Tibial Pulses : Palpable yes                             Varicosities Lower Limbs :  None    Neuro: Negative bilateral Straight leg raise (seated position)                         See Musculoskeletal section for pertinent individual extremity examination                         No abnormal hand/wrist, foot/ankle, or facial/neck tremors.     CHART REVIEW     Past Medical History:   Diagnosis Date    Arthritis     Asthma     Cancer ([facility] [address] 2006    cervical    Cervical cancer [facility]) 2006    Chronic obstructive pulmonary disease (HCC)     Chronic pain     Depression     H/O cervical spine surgery 2013    C3-7    Headache     Ill-defined condition     cervical cancer    Psychotic disorder     Anxiety and Panic Disorders    Right thyroid nodule 3/26/2017     Current Outpatient Prescriptions   Medication Sig    oxyCODONE IR (ROXICODONE) 5 mg immediate release tablet One tablet BID    oxyCODONE (OXYIR) 5 mg capsule Take 1 Cap by mouth every six (6) hours as needed. Max Daily Amount: 20 mg.    venlafaxine-SR (EFFEXOR-XR) 75 mg capsule Take 1 Cap by mouth daily.  oxyCODONE-acetaminophen (PERCOCET) 5-325 mg per tablet Take 1 Tab by mouth every six (6) hours as needed for Pain. Max Daily Amount: 4 Tabs.  traMADol (ULTRAM) 50 mg tablet 50 mg.    diazePAM (VALIUM) 5 mg tablet Take 1 Tab by mouth ON CALL for 1 dose. Take 30 minutes prior to MRI    gabapentin (NEURONTIN) 100 mg capsule Take 2 Caps by mouth nightly.  albuterol (PROVENTIL HFA, VENTOLIN HFA, PROAIR HFA) 90 mcg/actuation inhaler Take 1 Puff by inhalation every four (4) hours as needed for Wheezing.  fluticasone (FLOVENT HFA) 110 mcg/actuation inhaler Take 1 Puff by inhalation every twelve (12) hours.  fluticasone (FLONASE) 50 mcg/actuation nasal spray 2 Sprays by Both Nostrils route daily. Indications: ALLERGIC RHINITIS    EPINEPHrine (EPIPEN) 0.3 mg/0.3 mL injection 0.3 mL by IntraMUSCular route once as needed for up to 1 dose.  cyclobenzaprine (FLEXERIL) 5 mg tablet Take 1 Tab by mouth three (3) times daily as needed for Muscle Spasm(s). No current facility-administered medications for this visit.       Allergies   Allergen Reactions    Latex, Natural Rubber Rash    Latex, Natural Rubber Hives    Amoxicillin Anaphylaxis    Amoxicillin Shortness of Breath and Swelling    Aspirin Itching    Bactrim Ds [Sulfamethoxazole-Trimethoprim] Diarrhea and Nausea and Vomiting    Benadryl [Diphenhydramine Hcl] Shortness of Breath and Swelling    Codeine Diarrhea, Itching and Nausea and Vomiting    Diphenhydramine Hcl Anaphylaxis    Levaquin [Levofloxacin] Rash     Thrush    Diphenhydramine Itching    Penicillins Hives    Tylenol [Acetaminophen] Itching    Aspirin Hives    Azithromycin Other (comments)     PATIENT STATED SHE CAN TOLERATE Z-MYESHA AS OF 11/2016 OR 1/2017 WHEN SHE RECEIVED IT FOR URI    Bactrim Ds [Sulfamethoxazole-Trimethoprim] Nausea and Vomiting    Codeine Nausea and Vomiting    Gabapentin Other (comments)     Patient noted heart palpitations    Tylenol [Acetaminophen] Hives and Itching     Sneezing and slight itching, eyes water     Past Surgical History:   Procedure Laterality Date    HX HYSTERECTOMY      HX OTHER SURGICAL      C3-7 SX 8 years ago    HX TONSILLECTOMY      HX WISDOM TEETH EXTRACTION      x4     Social History     Occupational History    Not on file. Social History Main Topics    Smoking status: Current Every Day Smoker     Packs/day: 1.00     Years: 31.00    Smokeless tobacco: Never Used    Alcohol use 0.0 oz/week     0 Standard drinks or equivalent per week      Comment: social    Drug use: No    Sexual activity: Yes     Family History   Problem Relation Age of Onset    Cancer Mother      colon    Elevated Lipids Mother     Thyroid Disease Mother     Allergic Rhinitis Mother     Heart Disease Father     Diabetes Father     Hypertension Father     Kidney Disease Father     No Known Problems Sister     Heart Disease Maternal Grandmother     Heart Disease Maternal Grandfather     Heart Disease Paternal Grandmother     No Known Problems Paternal Grandfather     Cancer Sister     Depression Son        REVIEW OF SYSTEMS : 3/27/2018  ALL BELOW ARE Negative except : SEE HPI       Constitutional: Negative for fever, chills and weight loss. Neg Weigh Loss  Cardiovascular: Negative for chest pain, claudication and leg swelling. SOB, CARMEN   Gastrointestinal: Negative for  pain, N/V/D/C, Blood in stool or urine,dysuria, hematuria,        Incontinence, pelvic pain  Musculoskeletal: see HPI. Neurological: Negative for dizziness and weakness.                  Negative for headaches,Visual Changes, Confusion, Seizures, Psychiatric/Behavioral: Negative for depression, memory loss and substance abuse. Extremities:  Negative for  hair changes, rash or skin lesion changes. Hematologic: Negative for Bleeding problems, bruising, pallor or swollen lymph nodes. Peripheral Vascular: No calf pain, vascular vein tenderness to calf pain              No calf throbbing, posterior knee throbbing pain    DIAGNOSTIC IMAGING      Dictation on: 03/27/2018  2:23 PM by: Di Sánchez [[id number]]         Written by Nino Frazier, as dictated by Yoli Diaz MD. IDr., Yoli Diaz MD, confirm that all documentation is accurate.

## 2018-03-27 NOTE — PROCEDURES
X-rays, three views, of the right foot, AP, lateral and oblique, reveal a healing fracture to the mid-diaphyseal region of the fifth metatarsal. Overall alignment is acceptable. There are no osteolytic or osteoblastic lesions on these non-weightbearing foot films. The fracture line is still apparent, however.

## 2018-05-14 DIAGNOSIS — F41.9 ANXIETY AND DEPRESSION: ICD-10-CM

## 2018-05-14 DIAGNOSIS — F32.A ANXIETY AND DEPRESSION: ICD-10-CM

## 2018-05-14 RX ORDER — VENLAFAXINE HYDROCHLORIDE 75 MG/1
75 CAPSULE, EXTENDED RELEASE ORAL DAILY
Qty: 30 CAP | Refills: 3 | Status: SHIPPED | OUTPATIENT
Start: 2018-05-14 | End: 2018-10-01 | Stop reason: SDUPTHER

## 2018-05-14 NOTE — TELEPHONE ENCOUNTER
Requested Prescriptions     Pending Prescriptions Disp Refills    venlafaxine-SR (EFFEXOR-XR) 75 mg capsule 30 Cap 3     Sig: Take 1 Cap by mouth daily. Patient needs medication she  Has mis placed it and she said she will pay for it out of her own pocket.  Please call [phone]

## 2018-05-31 ENCOUNTER — OFFICE VISIT (OUTPATIENT)
Dept: INTERNAL MEDICINE CLINIC | Age: 53
End: 2018-05-31

## 2018-05-31 VITALS
SYSTOLIC BLOOD PRESSURE: 142 MMHG | OXYGEN SATURATION: 98 % | HEIGHT: 68 IN | WEIGHT: 164 LBS | HEART RATE: 83 BPM | BODY MASS INDEX: 24.86 KG/M2 | RESPIRATION RATE: 16 BRPM | DIASTOLIC BLOOD PRESSURE: 82 MMHG

## 2018-05-31 DIAGNOSIS — B37.31 VAGINAL YEAST INFECTION: ICD-10-CM

## 2018-05-31 DIAGNOSIS — K12.2 ORAL ABSCESS: Primary | ICD-10-CM

## 2018-05-31 RX ORDER — CLINDAMYCIN HYDROCHLORIDE 300 MG/1
300 CAPSULE ORAL 3 TIMES DAILY
Qty: 30 CAP | Refills: 0 | Status: SHIPPED | OUTPATIENT
Start: 2018-05-31 | End: 2018-06-10

## 2018-05-31 RX ORDER — FLUCONAZOLE 150 MG/1
150 TABLET ORAL
Qty: 2 TAB | Refills: 0 | Status: SHIPPED | OUTPATIENT
Start: 2018-05-31 | End: 2018-06-04

## 2018-05-31 RX ORDER — TRAMADOL HYDROCHLORIDE 50 MG/1
50 TABLET ORAL
Qty: 9 TAB | Refills: 0 | Status: SHIPPED | OUTPATIENT
Start: 2018-05-31 | End: 2018-06-03

## 2018-05-31 NOTE — PROGRESS NOTES
Satish Tovar is a 45 y.o. female presenting today for Yeast Infection and Gum Problem  . HPI:  Satish Tovar presents to the office today for let upper gum oral abscess  and vaginal yeast infection for several days. Patient reports she needs major dental work and will schedule the appointment. She is complaining of right upper gum pain secondary to an abscess. She is also complaining of vaginal itching and noted \"I have a yeast infection\". .     ROS     Patient has mouth pain  Negative for chest pain or palpitation  Negative for cough, sputum projection or dyspnea      Allergies   Allergen Reactions    Latex, Natural Rubber Rash    Latex, Natural Rubber Hives    Amoxicillin Anaphylaxis    Amoxicillin Shortness of Breath and Swelling    Aspirin Itching    Bactrim Ds [Sulfamethoxazole-Trimethoprim] Diarrhea and Nausea and Vomiting    Benadryl [Diphenhydramine Hcl] Shortness of Breath and Swelling    Codeine Diarrhea, Itching and Nausea and Vomiting    Diphenhydramine Hcl Anaphylaxis    Levaquin [Levofloxacin] Rash     Thrush    Diphenhydramine Itching    Penicillins Hives    Tylenol [Acetaminophen] Itching    Aspirin Hives    Azithromycin Other (comments)     PATIENT STATED SHE CAN TOLERATE Z-MYESHA AS OF 11/2016 OR 1/2017 WHEN SHE RECEIVED IT FOR URI    Bactrim Ds [Sulfamethoxazole-Trimethoprim] Nausea and Vomiting    Codeine Nausea and Vomiting    Gabapentin Other (comments)     Patient noted heart palpitations    Tylenol [Acetaminophen] Hives and Itching     Sneezing and slight itching, eyes water       Current Outpatient Prescriptions   Medication Sig Dispense Refill    venlafaxine-SR (EFFEXOR-XR) 75 mg capsule Take 1 Cap by mouth daily. 30 Cap 3    albuterol (PROVENTIL HFA, VENTOLIN HFA, PROAIR HFA) 90 mcg/actuation inhaler Take 1 Puff by inhalation every four (4) hours as needed for Wheezing.  1 Inhaler 11    fluticasone (FLOVENT HFA) 110 mcg/actuation inhaler Take 1 Puff by inhalation every twelve (12) hours. 1 Inhaler 11    fluticasone (FLONASE) 50 mcg/actuation nasal spray 2 Sprays by Both Nostrils route daily. Indications: ALLERGIC RHINITIS 1 Bottle 11    EPINEPHrine (EPIPEN) 0.3 mg/0.3 mL injection 0.3 mL by IntraMUSCular route once as needed for up to 1 dose. 1 Syringe 11       Past Medical History:   Diagnosis Date    Arthritis     Asthma     Cancer ([facility] [address] 2006    cervical    Cervical cancer ([facility] 2006    Chronic obstructive pulmonary disease (HCC)     Chronic pain     Depression     H/O cervical spine surgery 2013    C3-7    Headache     Ill-defined condition     cervical cancer    Psychotic disorder     Anxiety and Panic Disorders    Right thyroid nodule 3/26/2017       Past Surgical History:   Procedure Laterality Date    HX HYSTERECTOMY      HX OTHER SURGICAL      C3-7 SX 8 years ago    HX TONSILLECTOMY      HX WISDOM TEETH EXTRACTION      x4       Social History     Social History    Marital status:      Spouse name: N/A    Number of children: N/A    Years of education: N/A     Occupational History    Not on file. Social History Main Topics    Smoking status: Current Every Day Smoker     Packs/day: 1.00     Years: 31.00    Smokeless tobacco: Never Used    Alcohol use 0.0 oz/week     0 Standard drinks or equivalent per week      Comment: social    Drug use: No    Sexual activity: Yes     Other Topics Concern    Not on file     Social History Narrative    ** Merged History Encounter **            Patient does not have an advanced directive on file    Vitals:    05/31/18 1423   BP: 142/82   Pulse: 83   Resp: 16   SpO2: 98%   Weight: 164 lb (74.4 kg)   Height: 5' 8\" (1.727 m)   PainSc:   7   PainLoc: Mouth       Physical Exam     Mouth- erythematous abscess to the right upper gum  Chest-  Normal rate and rhythm. Patient is negative for murmur or gallop  Lungs- CTA.  No wheezing    No visits with results within 3 Month(s) from this visit. Latest known visit with results is:    Hospital Outpatient Visit on 11/21/2017   Component Date Value Ref Range Status    WBC 11/21/2017 6.7  4.6 - 13.2 K/uL Final    RBC 11/21/2017 4.89  4.20 - 5.30 M/uL Final    HGB 11/21/2017 15.9  12.0 - 16.0 g/dL Final    HCT 11/21/2017 48.4* 35.0 - 45.0 % Final    MCV 11/21/2017 99.0* 74.0 - 97.0 FL Final    MCH 11/21/2017 32.5  24.0 - 34.0 PG Final    MCHC 11/21/2017 32.9  31.0 - 37.0 g/dL Final    RDW 11/21/2017 13.0  11.6 - 14.5 % Final    PLATELET 53/71/5922 445  135 - 420 K/uL Final    MPV 11/21/2017 11.3  9.2 - 11.8 FL Final    NEUTROPHILS 11/21/2017 58  40 - 73 % Final    LYMPHOCYTES 11/21/2017 31  21 - 52 % Final    MONOCYTES 11/21/2017 7  3 - 10 % Final    EOSINOPHILS 11/21/2017 3  0 - 5 % Final    BASOPHILS 11/21/2017 1  0 - 2 % Final    ABS. NEUTROPHILS 11/21/2017 3.9  1.8 - 8.0 K/UL Final    ABS. LYMPHOCYTES 11/21/2017 2.1  0.9 - 3.6 K/UL Final    ABS. MONOCYTES 11/21/2017 0.5  0.05 - 1.2 K/UL Final    ABS. EOSINOPHILS 11/21/2017 0.2  0.0 - 0.4 K/UL Final    ABS. BASOPHILS 11/21/2017 0.0  0.0 - 0.06 K/UL Final    DF 11/21/2017 AUTOMATED    Final       .No results found for any visits on 05/31/18. Assessment / Plan:      ICD-10-CM ICD-9-CM    1. Oral abscess K12.2 528.3 traMADol (ULTRAM) 50 mg tablet   2. Vaginal yeast infection B37.3 112.1 fluconazole (DIFLUCAN) 150 mg tablet     Oral abscess- patient given Clindamycin rx (multiple allergies)  Vaginal yeast- Fluconazole rx  Follow-up with the Dentist for oral pain   For fever or worsening of symptoms to the ED or return to the office      Follow-up Disposition:  Return if symptoms worsen or fail to improve. I asked the patient if she  had any questions and answered her  questions.   The patient stated that she understands the treatment plan and agrees with the treatment plan

## 2018-10-01 DIAGNOSIS — F41.9 ANXIETY AND DEPRESSION: ICD-10-CM

## 2018-10-01 DIAGNOSIS — F32.A ANXIETY AND DEPRESSION: ICD-10-CM

## 2018-10-04 RX ORDER — VENLAFAXINE HYDROCHLORIDE 75 MG/1
75 CAPSULE, EXTENDED RELEASE ORAL DAILY
Qty: 30 CAP | Refills: 3 | Status: SHIPPED | OUTPATIENT
Start: 2018-10-04 | End: 2018-11-06 | Stop reason: SDUPTHER

## 2018-11-06 ENCOUNTER — OFFICE VISIT (OUTPATIENT)
Dept: INTERNAL MEDICINE CLINIC | Age: 53
End: 2018-11-06

## 2018-11-06 VITALS
OXYGEN SATURATION: 97 % | WEIGHT: 155.7 LBS | BODY MASS INDEX: 23.6 KG/M2 | RESPIRATION RATE: 18 BRPM | DIASTOLIC BLOOD PRESSURE: 76 MMHG | TEMPERATURE: 99.1 F | SYSTOLIC BLOOD PRESSURE: 142 MMHG | HEART RATE: 91 BPM | HEIGHT: 68 IN

## 2018-11-06 DIAGNOSIS — J42 CHRONIC BRONCHITIS, UNSPECIFIED CHRONIC BRONCHITIS TYPE (HCC): ICD-10-CM

## 2018-11-06 DIAGNOSIS — Z00.00 MEDICARE ANNUAL WELLNESS VISIT, SUBSEQUENT: ICD-10-CM

## 2018-11-06 DIAGNOSIS — F32.A ANXIETY AND DEPRESSION: ICD-10-CM

## 2018-11-06 DIAGNOSIS — F41.9 ANXIETY AND DEPRESSION: ICD-10-CM

## 2018-11-06 DIAGNOSIS — J30.9 ALLERGIC RHINITIS, UNSPECIFIED SEASONALITY, UNSPECIFIED TRIGGER: ICD-10-CM

## 2018-11-06 DIAGNOSIS — M25.522 LEFT ELBOW PAIN: Primary | ICD-10-CM

## 2018-11-06 RX ORDER — VENLAFAXINE HYDROCHLORIDE 75 MG/1
75 CAPSULE, EXTENDED RELEASE ORAL DAILY
Qty: 30 CAP | Refills: 3 | Status: SHIPPED | OUTPATIENT
Start: 2018-11-06 | End: 2019-01-10 | Stop reason: SDUPTHER

## 2018-11-06 RX ORDER — FLUTICASONE PROPIONATE 110 UG/1
1 AEROSOL, METERED RESPIRATORY (INHALATION) EVERY 12 HOURS
Qty: 1 INHALER | Refills: 11 | Status: SHIPPED | OUTPATIENT
Start: 2018-11-06

## 2018-11-06 RX ORDER — NAPROXEN 500 MG/1
500 TABLET ORAL 2 TIMES DAILY WITH MEALS
Qty: 20 TAB | Refills: 0 | Status: SHIPPED | OUTPATIENT
Start: 2018-11-06 | End: 2018-11-16

## 2018-11-06 RX ORDER — ALBUTEROL SULFATE 90 UG/1
1 AEROSOL, METERED RESPIRATORY (INHALATION)
Qty: 1 INHALER | Refills: 11 | Status: SHIPPED | OUTPATIENT
Start: 2018-11-06

## 2018-11-06 RX ORDER — FLUTICASONE PROPIONATE 50 MCG
2 SPRAY, SUSPENSION (ML) NASAL DAILY
Qty: 1 BOTTLE | Refills: 11 | Status: SHIPPED | OUTPATIENT
Start: 2018-11-06

## 2018-11-06 NOTE — PROGRESS NOTES
Joycelyn Grigsby is a 52 y.o. female presenting today for Annual Wellness Visit and Elbow Pain (LEFT)  . HPI:  Joycelyn Grigsby presents to the office today for elbow pain patient is complaining of severe left elbow pain on a scale of 7 out of 10. Michael Hernandez She has decreased range of motion and tenderness to touch. Patient does admits that she has been doing a lot of cleaning in her house and a lot of lifting due to her moving and thinks that might be contributed to her pain. Review of Systems   Respiratory: Negative for cough. Cardiovascular: Negative for chest pain and palpitations. Musculoskeletal: Positive for joint pain ( Left elbow) and myalgias. Allergies   Allergen Reactions    Latex, Natural Rubber Rash    Latex, Natural Rubber Hives    Amoxicillin Anaphylaxis    Amoxicillin Shortness of Breath and Swelling    Aspirin Itching    Bactrim Ds [Sulfamethoxazole-Trimethoprim] Diarrhea and Nausea and Vomiting    Benadryl [Diphenhydramine Hcl] Shortness of Breath and Swelling    Codeine Diarrhea, Itching and Nausea and Vomiting    Diphenhydramine Hcl Anaphylaxis    Levaquin [Levofloxacin] Rash     Thrush    Diphenhydramine Itching    Penicillins Hives    Tylenol [Acetaminophen] Itching    Aspirin Hives    Azithromycin Other (comments)     PATIENT STATED SHE CAN TOLERATE Z-MYESHA AS OF 11/2016 OR 1/2017 WHEN SHE RECEIVED IT FOR URI    Bactrim Ds [Sulfamethoxazole-Trimethoprim] Nausea and Vomiting    Codeine Nausea and Vomiting    Gabapentin Other (comments)     Patient noted heart palpitations    Tylenol [Acetaminophen] Hives and Itching     Sneezing and slight itching, eyes water       Current Outpatient Medications   Medication Sig Dispense Refill    venlafaxine-SR (EFFEXOR-XR) 75 mg capsule Take 1 Cap by mouth daily. 30 Cap 3    albuterol (PROVENTIL HFA, VENTOLIN HFA, PROAIR HFA) 90 mcg/actuation inhaler Take 1 Puff by inhalation every four (4) hours as needed for Wheezing.  1 Inhaler 11    fluticasone (FLOVENT HFA) 110 mcg/actuation inhaler Take 1 Puff by inhalation every twelve (12) hours. 1 Inhaler 11    fluticasone (FLONASE) 50 mcg/actuation nasal spray 2 Sprays by Both Nostrils route daily. 1 Bottle 11    EPINEPHrine (EPIPEN) 0.3 mg/0.3 mL injection 0.3 mL by IntraMUSCular route once as needed for up to 1 dose. 1 Syringe 11       Past Medical History:   Diagnosis Date    Arthritis     Asthma     Cancer ([facility] [address] 2006    cervical    [facility]    Chronic obstructive pulmonary disease (HCC)     Chronic pain     Depression     H/O cervical spine surgery 2013    C3-7    Headache     Ill-defined condition     cervical cancer    Psychotic disorder (HCC)     Anxiety and Panic Disorders    Right thyroid nodule 3/26/2017       Past Surgical History:   Procedure Laterality Date    HX HYSTERECTOMY      HX OTHER SURGICAL      C3-7 SX 8 years ago    HX TONSILLECTOMY      HX WISDOM TEETH EXTRACTION      x4       Social History     Socioeconomic History    Marital status:      Spouse name: Not on file    Number of children: Not on file    Years of education: Not on file    Highest education level: Not on file   Social Needs    Financial resource strain: Not on file    Food insecurity - worry: Not on file    Food insecurity - inability: Not on file   [employer] needs - medical: Not on file   [employer] needs - non-medical: Not on file   Occupational History    Not on file   Tobacco Use    Smoking status: Current Every Day Smoker     Packs/day: 1.00     Years: 31.00     Pack years: 31.00    Smokeless tobacco: Never Used   Substance and Sexual Activity    Alcohol use:  Yes     Alcohol/week: 0.0 oz     Comment: social    Drug use: No    Sexual activity: Yes   Other Topics Concern    Not on file   Social History Narrative    ** Merged History Encounter **            Patient does have an advanced directive on file    Vitals:    11/06/18 1516 BP: 142/76   Pulse: 91   Resp: 18   Temp: 99.1 °F (37.3 °C)   TempSrc: Tympanic   SpO2: 97%   Weight: 155 lb 11.2 oz (70.6 kg)   Height: 5' 8\" (1.727 m)   PainSc:   7   PainLoc: Elbow       Physical Exam   Cardiovascular: Normal rate, regular rhythm and normal heart sounds. Pulmonary/Chest: Effort normal and breath sounds normal.   Musculoskeletal: She exhibits tenderness. She exhibits no edema. Left elbow: She exhibits decreased range of motion. She exhibits no effusion and no deformity. Tenderness found. No visits with results within 3 Month(s) from this visit. Latest known visit with results is:   Hospital Outpatient Visit on 11/21/2017   Component Date Value Ref Range Status    WBC 11/21/2017 6.7  4.6 - 13.2 K/uL Final    RBC 11/21/2017 4.89  4.20 - 5.30 M/uL Final    HGB 11/21/2017 15.9  12.0 - 16.0 g/dL Final    HCT 11/21/2017 48.4* 35.0 - 45.0 % Final    MCV 11/21/2017 99.0* 74.0 - 97.0 FL Final    MCH 11/21/2017 32.5  24.0 - 34.0 PG Final    MCHC 11/21/2017 32.9  31.0 - 37.0 g/dL Final    RDW 11/21/2017 13.0  11.6 - 14.5 % Final    PLATELET 93/92/7549 994  135 - 420 K/uL Final    MPV 11/21/2017 11.3  9.2 - 11.8 FL Final    NEUTROPHILS 11/21/2017 58  40 - 73 % Final    LYMPHOCYTES 11/21/2017 31  21 - 52 % Final    MONOCYTES 11/21/2017 7  3 - 10 % Final    EOSINOPHILS 11/21/2017 3  0 - 5 % Final    BASOPHILS 11/21/2017 1  0 - 2 % Final    ABS. NEUTROPHILS 11/21/2017 3.9  1.8 - 8.0 K/UL Final    ABS. LYMPHOCYTES 11/21/2017 2.1  0.9 - 3.6 K/UL Final    ABS. MONOCYTES 11/21/2017 0.5  0.05 - 1.2 K/UL Final    ABS. EOSINOPHILS 11/21/2017 0.2  0.0 - 0.4 K/UL Final    ABS. BASOPHILS 11/21/2017 0.0  0.0 - 0.06 K/UL Final    DF 11/21/2017 AUTOMATED    Final       .No results found for any visits on 11/06/18. Assessment / Plan:      ICD-10-CM ICD-9-CM    1. Anxiety and depression F41.9 300.00 venlafaxine-SR (EFFEXOR-XR) 75 mg capsule    F32.9 311    2.  Chronic bronchitis, unspecified chronic bronchitis type (HCC) J42 491.9 albuterol (PROVENTIL HFA, VENTOLIN HFA, PROAIR HFA) 90 mcg/actuation inhaler      fluticasone (FLOVENT HFA) 110 mcg/actuation inhaler   3. Allergic rhinitis, unspecified seasonality, unspecified trigger J30.9 477.9 fluticasone (FLONASE) 50 mcg/actuation nasal spray   4. Left elbow pain M25.522 719.42 XR ELBOW LT MIN 3 V      naproxen (NAPROSYN) 500 mg tablet   Patient has a history of anxiety milligrams prescription given  Patient has a history of chronic bronchitis. patient also is complaining of rhinitis will give patient prescription for Flonase and refill Proventil  Left elbow pain-we will do x-ray of the left elbow and patient given naproxen times 10 days  Follow-up in 1 week    Follow-up Disposition:  Return if symptoms worsen or fail to improve. I asked the patient if she  had any questions and answered her  questions. The patient stated that she understands the treatment plan and agrees with the treatment plan    This is the Subsequent Medicare Annual Wellness Exam, performed 12 months or more after the Initial AWV or the last Subsequent AWV    I have reviewed the patient's medical history in detail and updated the computerized patient record.      History     Past Medical History:   Diagnosis Date    Arthritis     Asthma     Cancer ([facility] [address] 2006    cervical    Cervical cancer ([facility] [address] 2006    Chronic obstructive pulmonary disease (HCC)     Chronic pain     Depression     H/O cervical spine surgery 2013    C3-7    Headache     Ill-defined condition     cervical cancer    Psychotic disorder (HCC)     Anxiety and Panic Disorders    Right thyroid nodule 3/26/2017      Past Surgical History:   Procedure Laterality Date    HX HYSTERECTOMY      HX OTHER SURGICAL      C3-7 SX 8 years ago    HX TONSILLECTOMY      HX WISDOM TEETH EXTRACTION      x4     Current Outpatient Medications   Medication Sig Dispense Refill    venlafaxine-SR (EFFEXOR-XR) 75 mg capsule Take 1 Cap by mouth daily. 30 Cap 3    albuterol (PROVENTIL HFA, VENTOLIN HFA, PROAIR HFA) 90 mcg/actuation inhaler Take 1 Puff by inhalation every four (4) hours as needed for Wheezing. 1 Inhaler 11    fluticasone (FLOVENT HFA) 110 mcg/actuation inhaler Take 1 Puff by inhalation every twelve (12) hours. 1 Inhaler 11    fluticasone (FLONASE) 50 mcg/actuation nasal spray 2 Sprays by Both Nostrils route daily. 1 Bottle 11    EPINEPHrine (EPIPEN) 0.3 mg/0.3 mL injection 0.3 mL by IntraMUSCular route once as needed for up to 1 dose.  1 Syringe 11     Allergies   Allergen Reactions    Latex, Natural Rubber Rash    Latex, Natural Rubber Hives    Amoxicillin Anaphylaxis    Amoxicillin Shortness of Breath and Swelling    Aspirin Itching    Bactrim Ds [Sulfamethoxazole-Trimethoprim] Diarrhea and Nausea and Vomiting    Benadryl [Diphenhydramine Hcl] Shortness of Breath and Swelling    Codeine Diarrhea, Itching and Nausea and Vomiting    Diphenhydramine Hcl Anaphylaxis    Levaquin [Levofloxacin] Rash     Thrush    Diphenhydramine Itching    Penicillins Hives    Tylenol [Acetaminophen] Itching    Aspirin Hives    Azithromycin Other (comments)     PATIENT STATED SHE CAN TOLERATE Z-MEYSHA AS OF 11/2016 OR 1/2017 WHEN SHE RECEIVED IT FOR URI    Bactrim Ds [Sulfamethoxazole-Trimethoprim] Nausea and Vomiting    Codeine Nausea and Vomiting    Gabapentin Other (comments)     Patient noted heart palpitations    Tylenol [Acetaminophen] Hives and Itching     Sneezing and slight itching, eyes water     Family History   Problem Relation Age of Onset    Cancer Mother         colon    Elevated Lipids Mother     Thyroid Disease Mother     Allergic Rhinitis Mother     Heart Disease Father     Diabetes Father     Hypertension Father     Kidney Disease Father     No Known Problems Sister     Heart Disease Maternal Grandmother     Heart Disease Maternal Grandfather     Heart Disease Paternal Grandmother    [facility] No Known Problems Paternal Grandfather     Cancer Sister     Depression Son      Social History     Tobacco Use    Smoking status: Current Every Day Smoker     Packs/day: 1.00     Years: 31.00     Pack years: 31.00    Smokeless tobacco: Never Used   Substance Use Topics    Alcohol use:  Yes     Alcohol/week: 0.0 oz     Comment: social     Patient Active Problem List   Diagnosis Code    Chronic bronchitis J42    Allergic rhinitis J30.9    Degeneration of intervertebral disc of cervical region M50.30    Cervical radiculopathy M54.12    Right ankle pain M25.571    Anxiety and depression F41.9, F32.9    Neuropathy G62.9    Chronic midline low back pain with bilateral sciatica M54.41, M54.42, G89.29    Chronic pain syndrome G89.4    Lumbar degenerative disc disease M51.36    History of neck surgery Z98.890    Nicotine dependence, cigarettes, uncomplicated T48.665    Controlled substance agreement signed 3/22/17 Z79.899    Hiatal hernia suggested by CT scan findings K44.9    Right thyroid nodule E04.1    Lumbar strain S39.012A       Depression Risk Factor Screening:     PHQ over the last two weeks 6/11/2017   PHQ Not Done -   Little interest or pleasure in doing things Nearly every day   Feeling down, depressed, irritable, or hopeless Nearly every day   Total Score PHQ 2 6   Trouble falling or staying asleep, or sleeping too much More than half the days   Feeling tired or having little energy More than half the days   Poor appetite, weight loss, or overeating Several days   Feeling bad about yourself - or that you are a failure or have let yourself or your family down Nearly every day   Trouble concentrating on things such as school, work, reading, or watching TV Nearly every day   Moving or speaking so slowly that other people could have noticed; or the opposite being so fidgety that others notice Several days   Thoughts of being better off dead, or hurting yourself in some way Several days   PHQ 9 Score 19   How difficult have these problems made it for you to do your work, take care of your home and get along with others Very difficult     Alcohol Risk Factor Screening: You do not drink alcohol or very rarely. Functional Ability and Level of Safety:   Hearing Loss  Hearing is good. Activities of Daily Living  The home contains: no safety equipment. Patient does total self care    Fall Risk  Fall Risk Assessment, last 12 mths 4/18/2017   Able to walk? Yes   Fall in past 12 months? Yes   Fall with injury? No       Abuse Screen  Patient is not abused    Cognitive Screening   Evaluation of Cognitive Function:  Has your family/caregiver stated any concerns about your memory: no  Normal    Patient Care Team   Patient Care Team:  Es Colon NP as PCP - General (Nurse Practitioner)  Tommie Washington MD (Orthopedic Surgery)  Digna Gerard MD as Surgeon (Cardiothoracic Surgery)    Assessment/Plan   Education and counseling provided:  Are appropriate based on today's review and evaluation    Diagnoses and all orders for this visit:    1. Anxiety and depression  -     venlafaxine-SR (EFFEXOR-XR) 75 mg capsule; Take 1 Cap by mouth daily. 2. Chronic bronchitis, unspecified chronic bronchitis type (HCC)  -     albuterol (PROVENTIL HFA, VENTOLIN HFA, PROAIR HFA) 90 mcg/actuation inhaler; Take 1 Puff by inhalation every four (4) hours as needed for Wheezing.  -     fluticasone (FLOVENT HFA) 110 mcg/actuation inhaler; Take 1 Puff by inhalation every twelve (12) hours. 3. Allergic rhinitis, unspecified seasonality, unspecified trigger  -     fluticasone (FLONASE) 50 mcg/actuation nasal spray; 2 Sprays by Both Nostrils route daily. 4. Left elbow pain  -     XR ELBOW LT MIN 3 V; Future  -     naproxen (NAPROSYN) 500 mg tablet; Take 1 Tab by mouth two (2) times daily (with meals) for 10 days.         Health Maintenance Due   Topic Date Due    COLONOSCOPY  08/11/1981    Shingrix Vaccine Age 50> (1 of 2) 08/11/2013    BREAST CANCER SCRN MAMMOGRAM  08/11/2013    MEDICARE YEARLY EXAM  03/14/2018    Influenza Age 9 to Adult  08/01/2018

## 2019-01-07 DIAGNOSIS — T78.2XXS ANAPHYLAXIS, SEQUELA: ICD-10-CM

## 2019-01-07 NOTE — TELEPHONE ENCOUNTER
Requested Prescriptions     Pending Prescriptions Disp Refills    EPINEPHrine (EPIPEN) 0.3 mg/0.3 mL injection 1 Syringe 11     Si.3 mL by IntraMUSCular route once as needed for up to 1 dose.

## 2019-01-08 RX ORDER — EPINEPHRINE 0.3 MG/.3ML
0.3 INJECTION SUBCUTANEOUS
Qty: 1 SYRINGE | Refills: 11 | Status: SHIPPED | OUTPATIENT
Start: 2019-01-08 | End: 2019-01-08

## 2019-01-10 DIAGNOSIS — F32.A ANXIETY AND DEPRESSION: ICD-10-CM

## 2019-01-10 DIAGNOSIS — F41.9 ANXIETY AND DEPRESSION: ICD-10-CM

## 2019-01-11 RX ORDER — VENLAFAXINE HYDROCHLORIDE 75 MG/1
CAPSULE, EXTENDED RELEASE ORAL
Qty: 90 CAP | Refills: 1 | Status: SHIPPED | OUTPATIENT
Start: 2019-01-11 | End: 2019-02-09 | Stop reason: SDUPTHER

## 2019-02-09 DIAGNOSIS — F41.9 ANXIETY AND DEPRESSION: ICD-10-CM

## 2019-02-09 DIAGNOSIS — F32.A ANXIETY AND DEPRESSION: ICD-10-CM

## 2019-02-11 RX ORDER — VENLAFAXINE HYDROCHLORIDE 75 MG/1
CAPSULE, EXTENDED RELEASE ORAL
Qty: 90 CAP | Refills: 1 | Status: SHIPPED | OUTPATIENT
Start: 2019-02-11

## 2019-02-11 NOTE — TELEPHONE ENCOUNTER
Requested Prescriptions     Pending Prescriptions Disp Refills    venlafaxine-SR (EFFEXOR-XR) 75 mg capsule [Pharmacy Med Name: VENLAFAXINE ER 75MG CAPSULES] 30 Cap 0     Sig: TAKE 1 CAPSULE BY MOUTH EVERY DAY          PATIENT IS OUT NEEDS TODAY IF AT ALL POSSIBLE

## 2019-04-07 NOTE — PROGRESS NOTES
Patient presents for   Chief Complaint   Patient presents with    Elevated Blood Pressure     Fall risk assessment was not indicated. Depression screening was not indicated Follow up questions were not indicated. 1. Have you been to the ER, urgent care clinic since your last visit? Hospitalized since your last visit? No    2. Have you seen or consulted any other health care providers outside of the [address] [place] [address] since your last visit? Include any pap smears or colon screening.  No Attending Attestation (For Attendings USE Only)...

## 2019-06-06 NOTE — TELEPHONE ENCOUNTER
Pt called back , due to the mixup with getting her an apppment to the neurologist, she will have to wait to get her pain meds oxycodone hcl mgs, she last had it filled 02/18/2017 by Dr. Bandar Birmingham from [facility], she said she is having extreme pain and unable to sleep due a broken sternum and other issues she is having, she needs asap, [facility] Area H Indication Text: Tumors in this location are included in Area H (eyelids, eyebrows, nose, lips, chin, ear, pre-auricular, post-auricular, temple, genitalia, hands, feet, ankles and areola).  Tissue conservation is critical in these anatomic locations.